# Patient Record
Sex: MALE | Race: WHITE | NOT HISPANIC OR LATINO | Employment: FULL TIME | URBAN - METROPOLITAN AREA
[De-identification: names, ages, dates, MRNs, and addresses within clinical notes are randomized per-mention and may not be internally consistent; named-entity substitution may affect disease eponyms.]

---

## 2020-07-06 ENCOUNTER — APPOINTMENT (OUTPATIENT)
Dept: CT IMAGING | Facility: CLINIC | Age: 20
End: 2020-07-06
Attending: FAMILY MEDICINE
Payer: OTHER MISCELLANEOUS

## 2020-07-06 ENCOUNTER — APPOINTMENT (OUTPATIENT)
Dept: GENERAL RADIOLOGY | Facility: CLINIC | Age: 20
End: 2020-07-06
Attending: FAMILY MEDICINE
Payer: OTHER MISCELLANEOUS

## 2020-07-06 ENCOUNTER — HOSPITAL ENCOUNTER (EMERGENCY)
Facility: CLINIC | Age: 20
Discharge: HOME | End: 2020-07-06
Attending: FAMILY MEDICINE | Admitting: FAMILY MEDICINE
Payer: OTHER MISCELLANEOUS

## 2020-07-06 VITALS
RESPIRATION RATE: 16 BRPM | DIASTOLIC BLOOD PRESSURE: 87 MMHG | TEMPERATURE: 98.7 F | OXYGEN SATURATION: 99 % | SYSTOLIC BLOOD PRESSURE: 132 MMHG | WEIGHT: 165 LBS

## 2020-07-06 DIAGNOSIS — G47.00 INSOMNIA, UNSPECIFIED TYPE: ICD-10-CM

## 2020-07-06 DIAGNOSIS — F07.81 POSTCONCUSSION SYNDROME: ICD-10-CM

## 2020-07-06 DIAGNOSIS — S60.222A CONTUSION OF LEFT HAND, INITIAL ENCOUNTER: ICD-10-CM

## 2020-07-06 PROCEDURE — 73130 X-RAY EXAM OF HAND: CPT | Mod: TC,LT

## 2020-07-06 PROCEDURE — 99284 EMERGENCY DEPT VISIT MOD MDM: CPT | Mod: Z6 | Performed by: FAMILY MEDICINE

## 2020-07-06 PROCEDURE — 99284 EMERGENCY DEPT VISIT MOD MDM: CPT | Mod: 25 | Performed by: FAMILY MEDICINE

## 2020-07-06 PROCEDURE — 70450 CT HEAD/BRAIN W/O DYE: CPT

## 2020-07-06 NOTE — ED TRIAGE NOTES
"Pt presents to ED after two falls. First Wed at work hit head on semi door. Pt has since had concussion symptoms and yesterday he blacked out and fell hitting his head again. Does not remember the incident and now has random radiating numbness to arms and legs. Pt states he slept for a day and a half since, waking up this morning at 8am. Pt concerned because of past head injuries and the numbness and some vision \"lagging\".  "

## 2020-07-06 NOTE — ED PROVIDER NOTES
History     Chief Complaint   Patient presents with     Headache     Hand Injury     HPI  Goran Reddnig is a 20 year old male who presents with concerns of continued headache and frequent falls.  Patient fell at work about 5 days ago.  Patient was seen on 1st of July for this.  They thought he had a concussion and sent him home.  He states since then he has been unable to sleep, he continues to have headaches.  He is concerned because he has been getting numbness all over.  He is getting spasms of both of his hands on occasion.  Patient feels like there is something really wrong.  Patient is had a lot of nausea and states he has been having loose stools but no more than 1 or 2 a day.  Denies any dysuria or hematuria.  Patient has been eating and drinking normally.    Allergies:  No Known Allergies    Problem List:    Patient Active Problem List    Diagnosis Date Noted     Acne 11/07/2012     Priority: Medium     ADHD (attention deficit hyperactivity disorder) 02/08/2012     Priority: Medium     2/8/2012  Diagnosis inattentive type. Trial of Methylin ER 10mg /day  5/23/2012  Dose increased over time to Methlyphenidate ER 20mg   2 tab daily  11/7/2012  Med changes recently to Adderall xR 20mg daily.  Seems better for appetite /dry mouth but wearing off before noon.  Increased to 40mg XR daily.   12/14/2012  Reported symptoms of insomnia/hallucination after change to 15mg bid Adderall.  Med currently stopped.  Will follow up after holiday.  Psychiatry refer recommended.  Substance use possibility again discussed.        Auditory processing disorder 02/08/2012     Priority: Medium     Has received speech and language through [employer].  Does not qualify for school services.           Past Medical History:    Past Medical History:   Diagnosis Date     Henoch-Schonlein purpura (H)        Past Surgical History:    Past Surgical History:   Procedure Laterality Date     pe tubes x 3         Family History:     Family History   Problem Relation Age of Onset     Heart Disease Mother         tachycardia     Neurologic Disorder Mother         adhd     Cancer - colorectal Maternal Grandmother 62        Colon Cancer     Hypertension Maternal Grandfather      Heart Disease Maternal Grandfather 56        Massive Heart Attack     Cancer Maternal Uncle 50        Eshopahgeal, Colon Cancer     Neurologic Disorder Brother         autism     Neurologic Disorder Brother         motor tic       Social History:  Marital Status:  Single [1]  Social History     Tobacco Use     Smoking status: Never Smoker     Smokeless tobacco: Never Used   Substance Use Topics     Alcohol use: No     Drug use: No     Comment: THC prescription        Medications:    amphetamine-dextroamphetamine (ADDERALL XR) 20 MG per capsule  amphetamine-dextroamphetamine (ADDERALL) 15 MG tablet  NO ACTIVE MEDICATIONS  tretinoin (RETIN-A) 0.025 % cream          Review of Systems   All other systems reviewed and are negative.      Physical Exam   BP: 132/87  Heart Rate: 96  Temp: 98.7  F (37.1  C)  Resp: 16  Weight: 74.8 kg (165 lb)  SpO2: 99 %      Physical Exam  Vitals signs and nursing note reviewed.   Constitutional:       General: He is not in acute distress.     Appearance: He is well-developed. He is not diaphoretic.   HENT:      Head: Normocephalic and atraumatic.      Nose: Nose normal.      Mouth/Throat:      Pharynx: No oropharyngeal exudate.   Eyes:      General: No scleral icterus.     Conjunctiva/sclera: Conjunctivae normal.      Pupils: Pupils are equal, round, and reactive to light.   Neck:      Musculoskeletal: Normal range of motion.   Cardiovascular:      Rate and Rhythm: Normal rate and regular rhythm.      Heart sounds: Normal heart sounds. No murmur. No friction rub.   Pulmonary:      Effort: Pulmonary effort is normal. No respiratory distress.      Breath sounds: Normal breath sounds. No wheezing or rales.   Abdominal:      General: Bowel sounds  are normal. There is no distension.      Palpations: Abdomen is soft. There is no mass.      Tenderness: There is no abdominal tenderness. There is no guarding or rebound.   Musculoskeletal: Normal range of motion.      Left hand: He exhibits tenderness and swelling. He exhibits normal range of motion, normal capillary refill, no deformity and no laceration. Decreased sensation noted. Normal strength noted.   Skin:     General: Skin is warm.      Capillary Refill: Capillary refill takes less than 2 seconds.      Findings: No rash.   Neurological:      General: No focal deficit present.      Mental Status: He is alert and oriented to person, place, and time.      Cranial Nerves: No cranial nerve deficit.      Sensory: No sensory deficit.      Motor: No weakness.      Coordination: Coordination normal.      Gait: Gait normal.      Deep Tendon Reflexes: Reflexes normal.   Psychiatric:         Judgment: Judgment normal.         ED Course        Procedures      Results for orders placed or performed during the hospital encounter of 07/06/20 (from the past 24 hour(s))   CT Head w/o Contrast    Narrative    CT SCAN OF THE HEAD WITHOUT CONTRAST   7/6/2020 9:37 AM     HISTORY: Frequent falls, head injury, headache.    TECHNIQUE:  Axial images of the head and coronal reformations without  IV contrast material. Radiation dose for this scan was reduced using  automated exposure control, adjustment of the mA and/or kV according  to patient size, or iterative reconstruction technique.    COMPARISON: None.    FINDINGS: There is no evidence of intracranial hemorrhage, mass, acute  infarct or anomaly. The ventricles are normal in size, shape and  configuration. The brain parenchyma and subarachnoid spaces are  normal.     The visualized portions of the sinuses and mastoids appear normal. The  bony calvarium and bones of the skull base appear intact.       Impression    IMPRESSION:   Normal CT scan of the head.   XR Hand Left G/E 3  Views    Narrative    XR HAND LT G/E 3 VW 7/6/2020 9:46 AM     HISTORY: fall, hand pain      Impression    IMPRESSION: Dorsal soft tissue swelling. No apparent fracture or  dislocation.    CHAO VARGAS MD       Medications - No data to display     CT scan of the head was unremarkable and x-ray of the hand was unremarkable.  Patient certainly could still be dealing with some postconcussion-like symptoms.  Think a lot of his symptoms though are related from his lack of sleep which seems like more of a chronic condition and it is exacerbating all of his other symptoms.  Patient states that he is being treated for his insomnia with oxycodone which is prescribed by his primary care doctor.  I cannot find any documentation of this though.  Many give him a prescription for Vistaril because I think it may help with his sleep but also sound like some of his other symptoms are anxiety issues also.  Recommend close follow-up with his primary care doctor if his symptoms continue.  Patient's hand was x-rayed and this was normal also.  Patient appears to have just a bruise to it.  Recommend conservative care.    Assessments & Plan (with Medical Decision Making)  Postconcussion syndrome, insomnia, left hand contusion     I have reviewed the nursing notes.    I have reviewed the findings, diagnosis, plan and need for follow up with the patient.              7/6/2020   [facility] EMERGENCY DEPARTMENT     Kishor Soliman MD  07/06/20 7280

## 2020-07-06 NOTE — ED AVS SNAPSHOT
[facility] Emergency Department  [address] [place] [address]  Phone:  [phone]  Fax:  [phone]                                    Goran Redding   MRN: [id number]    Department:  [facility] Emergency Department   Date of Visit:  7/6/2020           After Visit Summary Signature Page    I have received my discharge instructions, and my questions have been answered. I have discussed any challenges I see with this plan with the nurse or doctor.    ..........................................................................................................................................  Patient/Patient Representative Signature      ..........................................................................................................................................  Patient Representative Print Name and Relationship to Patient    ..................................................               ................................................  Date                                   Time    ..........................................................................................................................................  Reviewed by Signature/Title    ...................................................              ..............................................  Date                                               Time          22EPIC Rev 08/18

## 2022-08-11 ENCOUNTER — TELEPHONE (OUTPATIENT)
Dept: FAMILY MEDICINE | Facility: CLINIC | Age: 22
End: 2022-08-11

## 2022-08-11 NOTE — TELEPHONE ENCOUNTER
Left Voicemal to reschedule pt. Please help patient reschedule the canceled appt below:       - Provider:  Dr. Funez           - Canceled appt details         Date:          Time:  6:45 a .m.          Type of visit:  Annual physical      - Reason for change/cancellation: clinician out of clinic       Notes to consider when reschedulin minutes       Please close encounter once the patient has been rescheduled

## 2022-08-18 ENCOUNTER — OFFICE VISIT (OUTPATIENT)
Dept: FAMILY MEDICINE | Facility: CLINIC | Age: 22
End: 2022-08-18
Payer: COMMERCIAL

## 2022-08-18 VITALS
DIASTOLIC BLOOD PRESSURE: 60 MMHG | BODY MASS INDEX: 19.4 KG/M2 | SYSTOLIC BLOOD PRESSURE: 92 MMHG | TEMPERATURE: 98 F | HEART RATE: 76 BPM | HEIGHT: 75 IN | WEIGHT: 156 LBS | RESPIRATION RATE: 14 BRPM | OXYGEN SATURATION: 97 %

## 2022-08-18 DIAGNOSIS — G89.29 CHRONIC BACK PAIN, UNSPECIFIED BACK LOCATION, UNSPECIFIED BACK PAIN LATERALITY: ICD-10-CM

## 2022-08-18 DIAGNOSIS — M54.9 CHRONIC BACK PAIN, UNSPECIFIED BACK LOCATION, UNSPECIFIED BACK PAIN LATERALITY: ICD-10-CM

## 2022-08-18 DIAGNOSIS — Z71.6 ENCOUNTER FOR TOBACCO USE CESSATION COUNSELING: ICD-10-CM

## 2022-08-18 DIAGNOSIS — Z00.00 ENCOUNTER FOR ROUTINE ADULT HEALTH EXAMINATION WITHOUT ABNORMAL FINDINGS: ICD-10-CM

## 2022-08-18 DIAGNOSIS — F10.11 ALCOHOL USE DISORDER, MILD, IN EARLY REMISSION, ABUSE: ICD-10-CM

## 2022-08-18 DIAGNOSIS — F90.9 ATTENTION DEFICIT HYPERACTIVITY DISORDER (ADHD), UNSPECIFIED ADHD TYPE: ICD-10-CM

## 2022-08-18 DIAGNOSIS — F43.21 ADJUSTMENT DISORDER WITH DEPRESSED MOOD: Primary | ICD-10-CM

## 2022-08-18 PROCEDURE — 99214 OFFICE O/P EST MOD 30 MIN: CPT | Mod: 25 | Performed by: PHYSICIAN ASSISTANT

## 2022-08-18 PROCEDURE — 99385 PREV VISIT NEW AGE 18-39: CPT | Performed by: PHYSICIAN ASSISTANT

## 2022-08-18 ASSESSMENT — ANXIETY QUESTIONNAIRES
2. NOT BEING ABLE TO STOP OR CONTROL WORRYING: NEARLY EVERY DAY
5. BEING SO RESTLESS THAT IT IS HARD TO SIT STILL: NEARLY EVERY DAY
6. BECOMING EASILY ANNOYED OR IRRITABLE: NOT AT ALL
GAD7 TOTAL SCORE: 15
1. FEELING NERVOUS, ANXIOUS, OR ON EDGE: NEARLY EVERY DAY
7. FEELING AFRAID AS IF SOMETHING AWFUL MIGHT HAPPEN: NOT AT ALL
IF YOU CHECKED OFF ANY PROBLEMS ON THIS QUESTIONNAIRE, HOW DIFFICULT HAVE THESE PROBLEMS MADE IT FOR YOU TO DO YOUR WORK, TAKE CARE OF THINGS AT HOME, OR GET ALONG WITH OTHER PEOPLE: VERY DIFFICULT
3. WORRYING TOO MUCH ABOUT DIFFERENT THINGS: NEARLY EVERY DAY
GAD7 TOTAL SCORE: 15

## 2022-08-18 ASSESSMENT — PAIN SCALES - GENERAL: PAINLEVEL: NO PAIN (0)

## 2022-08-18 ASSESSMENT — PATIENT HEALTH QUESTIONNAIRE - PHQ9
SUM OF ALL RESPONSES TO PHQ QUESTIONS 1-9: 12
5. POOR APPETITE OR OVEREATING: NEARLY EVERY DAY

## 2022-08-18 NOTE — PROGRESS NOTES
SUBJECTIVE:   CC: Goran Redding is an 22 year old male who presents for preventative health visit.     {Patient has been advised of split billing requirements and indicates understanding: Yes  Healthy Habits:     Getting at least 3 servings of Calcium per day:  Yes    Bi-annual eye exam:  NO    Dental care twice a year:  Yes    Sleep apnea or symptoms of sleep apnea:  Daytime drowsiness    Diet:  Regular (no restrictions)    Frequency of exercise:  2-3 days/week    Duration of exercise:  30-45 minutes    Taking medications regularly:  Yes    Medication side effects:  None    PHQ-2 Total Score: 0    Additional concerns today:  No    New patient to our clinic today  Struggling with anxiety/depression.   Has hx of ADHD, ETOH use now in remission.   Would like to talk about starting medication and therapy.      Today's PHQ-2 Score:   PHQ-2 ( 1999 [employer]) 8/18/2022   Q1: Little interest or pleasure in doing things 0   Q2: Feeling down, depressed or hopeless 0   PHQ-2 Score 0   Q1: Little interest or pleasure in doing things Not at all   Q2: Feeling down, depressed or hopeless Not at all   PHQ-2 Score 0       Abuse: Current or Past(Physical, Sexual or Emotional)- No  Do you feel safe in your environment? Yes    Have you ever done Advance Care Planning? (For example, a Health Directive, POLST, or a discussion with a medical provider or your loved ones about your wishes): not interested    Social History     Tobacco Use     Smoking status: Current Every Day Smoker     Smokeless tobacco: Never Used   Substance Use Topics     Alcohol use: No         Alcohol Use 8/18/2022   Prescreen: >3 drinks/day or >7 drinks/week? No     Last PSA: No results found for: PSA    Reviewed orders with patient. Reviewed health maintenance and updated orders accordingly - Yes  Patient Active Problem List   Diagnosis     ADHD (attention deficit hyperactivity disorder)     Auditory processing disorder     Acne     Alcohol use disorder, mild, in  early remission, abuse     Past Surgical History:   Procedure Laterality Date     pe tubes x 3         Social History     Tobacco Use     Smoking status: Current Every Day Smoker     Smokeless tobacco: Never Used   Substance Use Topics     Alcohol use: No     Family History   Problem Relation Age of Onset     Heart Disease Mother         tachycardia     Neurologic Disorder Mother         adhd     Cancer - colorectal Maternal Grandmother 62        Colon Cancer     Hypertension Maternal Grandfather      Heart Disease Maternal Grandfather 56        Massive Heart Attack     Cancer Maternal Uncle 50        Eshopahgeal, Colon Cancer     Neurologic Disorder Brother         autism     Neurologic Disorder Brother         motor tic         Current Outpatient Medications   Medication Sig Dispense Refill     FLUoxetine (PROZAC) 20 MG capsule Take 1 capsule (20 mg) by mouth daily 90 capsule 1     NO ACTIVE MEDICATIONS  (Patient not taking: Reported on 8/18/2022)       No Known Allergies    Reviewed and updated as needed this visit by clinical staff   Tobacco  Allergies  Meds                Reviewed and updated as needed this visit by Provider                   Past Medical History:   Diagnosis Date     Henoch-Schonlein purpura (H)     2006  no recur.       Past Surgical History:   Procedure Laterality Date     pe tubes x 3         Review of Systems  CONSTITUTIONAL: NEGATIVE for fever, chills, change in weight  INTEGUMENTARY/SKIN: NEGATIVE for worrisome rashes, moles or lesions  EYES: NEGATIVE for vision changes or irritation  ENT: NEGATIVE for ear, mouth and throat problems  RESP: NEGATIVE for significant cough or SOB  CV: NEGATIVE for chest pain, palpitations or peripheral edema  GI: NEGATIVE for nausea, abdominal pain, heartburn, or change in bowel habits   male: negative for dysuria, hematuria, decreased urinary stream, erectile dysfunction, urethral discharge  MUSCULOSKELETAL: NEGATIVE for significant arthralgias or  "myalgia  NEURO: NEGATIVE for weakness, dizziness or paresthesias  PSYCHIATRIC: NEGATIVE for changes in mood or affect    OBJECTIVE:   BP 92/60   Pulse 76   Temp 98  F (36.7  C) (Tympanic)   Resp 14   Ht 1.905 m (6' 3\")   Wt 70.8 kg (156 lb)   SpO2 97%   BMI 19.50 kg/m      Physical Exam  GENERAL: healthy, alert and no distress  EYES: Eyes grossly normal to inspection, PERRL and conjunctivae and sclerae normal  HENT: ear canals and TM's normal, nose and mouth without ulcers or lesions  NECK: no adenopathy, no asymmetry, masses, or scars and thyroid normal to palpation  RESP: lungs clear to auscultation - no rales, rhonchi or wheezes  CV: regular rate and rhythm, normal S1 S2, no S3 or S4, no murmur, click or rub, no peripheral edema and peripheral pulses strong  ABDOMEN: soft, nontender, no hepatosplenomegaly, no masses and bowel sounds normal  MS: no gross musculoskeletal defects noted, no edema  SKIN: no suspicious lesions or rashes  NEURO: Normal strength and tone, mentation intact and speech normal  PSYCH: mentation appears normal, affect normal/bright    Diagnostic Test Results:  none     ASSESSMENT/PLAN:       1. Encounter for routine adult health examination without abnormal findings    2. Adjustment disorder with depressed mood  Uses and SE discussed and understood by patient   - Adult Mental Health  Referral; Future  - FLUoxetine (PROZAC) 20 MG capsule; Take 1 capsule (20 mg) by mouth daily  Dispense: 90 capsule; Refill: 1    3. Chronic back pain, unspecified back location, unspecified back pain laterality    4. Attention deficit hyperactivity disorder (ADHD), unspecified ADHD type    5. Encounter for tobacco use cessation counseling    6. Alcohol use disorder, mild, in early remission, abuse        COUNSELING:   Reviewed preventive health counseling, as reflected in patient instructions       Regular exercise       Healthy diet/nutrition    Estimated body mass index is 19.5 kg/m  as " "calculated from the following:    Height as of this encounter: 1.905 m (6' 3\").    Weight as of this encounter: 70.8 kg (156 lb).         He reports that he has been smoking. He has never used smokeless tobacco.      Counseling Resources:  ATP IV Guidelines  Pooled Cohorts Equation Calculator  FRAX Risk Assessment  ICSI Preventive Guidelines  Dietary Guidelines for Americans, 2010  USDA's MyPlate  ASA Prophylaxis  Lung CA Screening    [facility]  "

## 2023-12-27 ENCOUNTER — TELEPHONE (OUTPATIENT)
Dept: BEHAVIORAL HEALTH | Facility: CLINIC | Age: 23
End: 2023-12-27

## 2023-12-27 ENCOUNTER — HOSPITAL ENCOUNTER (INPATIENT)
Facility: CLINIC | Age: 23
LOS: 2 days | Discharge: PSYCHIATRIC OR SUBSTANCE USE FACILITY | DRG: 897 | End: 2023-12-29
Attending: EMERGENCY MEDICINE | Admitting: PSYCHIATRY & NEUROLOGY
Payer: COMMERCIAL

## 2023-12-27 DIAGNOSIS — F10.230 ALCOHOL DEPENDENCE WITH UNCOMPLICATED WITHDRAWAL (H): ICD-10-CM

## 2023-12-27 DIAGNOSIS — F41.1 GENERALIZED ANXIETY DISORDER: Primary | ICD-10-CM

## 2023-12-27 DIAGNOSIS — F12.90 MARIJUANA USE: ICD-10-CM

## 2023-12-27 LAB
ALBUMIN SERPL BCG-MCNC: 5.4 G/DL (ref 3.5–5.2)
ALCOHOL BREATH TEST: 0.2 (ref 0–0.01)
ALP SERPL-CCNC: 79 U/L (ref 40–150)
ALT SERPL W P-5'-P-CCNC: 42 U/L (ref 0–70)
AMPHETAMINES UR QL SCN: ABNORMAL
ANION GAP SERPL CALCULATED.3IONS-SCNC: 16 MMOL/L (ref 7–15)
AST SERPL W P-5'-P-CCNC: 53 U/L (ref 0–45)
BARBITURATES UR QL SCN: ABNORMAL
BASOPHILS # BLD AUTO: 0.1 10E3/UL (ref 0–0.2)
BASOPHILS NFR BLD AUTO: 2 %
BENZODIAZ UR QL SCN: ABNORMAL
BILIRUB SERPL-MCNC: 1.3 MG/DL
BUN SERPL-MCNC: 13.3 MG/DL (ref 6–20)
BZE UR QL SCN: ABNORMAL
CALCIUM SERPL-MCNC: 10.1 MG/DL (ref 8.6–10)
CANNABINOIDS UR QL SCN: ABNORMAL
CHLORIDE SERPL-SCNC: 95 MMOL/L (ref 98–107)
CREAT SERPL-MCNC: 0.79 MG/DL (ref 0.67–1.17)
DEPRECATED HCO3 PLAS-SCNC: 27 MMOL/L (ref 22–29)
EGFRCR SERPLBLD CKD-EPI 2021: >90 ML/MIN/1.73M2
EOSINOPHIL # BLD AUTO: 0 10E3/UL (ref 0–0.7)
EOSINOPHIL NFR BLD AUTO: 0 %
ERYTHROCYTE [DISTWIDTH] IN BLOOD BY AUTOMATED COUNT: 12.6 % (ref 10–15)
ETHANOL SERPL-MCNC: 0.2 G/DL
FENTANYL UR QL: ABNORMAL
GLUCOSE SERPL-MCNC: 83 MG/DL (ref 70–99)
HCT VFR BLD AUTO: 51.6 % (ref 40–53)
HGB BLD-MCNC: 17.8 G/DL (ref 13.3–17.7)
IMM GRANULOCYTES # BLD: 0 10E3/UL
IMM GRANULOCYTES NFR BLD: 0 %
LYMPHOCYTES # BLD AUTO: 2 10E3/UL (ref 0.8–5.3)
LYMPHOCYTES NFR BLD AUTO: 29 %
MAGNESIUM SERPL-MCNC: 1.9 MG/DL (ref 1.7–2.3)
MCH RBC QN AUTO: 30.6 PG (ref 26.5–33)
MCHC RBC AUTO-ENTMCNC: 34.5 G/DL (ref 31.5–36.5)
MCV RBC AUTO: 89 FL (ref 78–100)
MONOCYTES # BLD AUTO: 0.5 10E3/UL (ref 0–1.3)
MONOCYTES NFR BLD AUTO: 7 %
NEUTROPHILS # BLD AUTO: 4.2 10E3/UL (ref 1.6–8.3)
NEUTROPHILS NFR BLD AUTO: 62 %
NRBC # BLD AUTO: 0 10E3/UL
NRBC BLD AUTO-RTO: 0 /100
OPIATES UR QL SCN: ABNORMAL
PCP QUAL URINE (ROCHE): ABNORMAL
PLATELET # BLD AUTO: 333 10E3/UL (ref 150–450)
POTASSIUM SERPL-SCNC: 4 MMOL/L (ref 3.4–5.3)
PROT SERPL-MCNC: 8.3 G/DL (ref 6.4–8.3)
RBC # BLD AUTO: 5.81 10E6/UL (ref 4.4–5.9)
SODIUM SERPL-SCNC: 138 MMOL/L (ref 135–145)
WBC # BLD AUTO: 6.8 10E3/UL (ref 4–11)

## 2023-12-27 PROCEDURE — 250N000013 HC RX MED GY IP 250 OP 250 PS 637: Performed by: EMERGENCY MEDICINE

## 2023-12-27 PROCEDURE — 80053 COMPREHEN METABOLIC PANEL: CPT | Performed by: EMERGENCY MEDICINE

## 2023-12-27 PROCEDURE — HZ2ZZZZ DETOXIFICATION SERVICES FOR SUBSTANCE ABUSE TREATMENT: ICD-10-PCS | Performed by: EMERGENCY MEDICINE

## 2023-12-27 PROCEDURE — 128N000004 HC R&B CD ADULT

## 2023-12-27 PROCEDURE — 80307 DRUG TEST PRSMV CHEM ANLYZR: CPT | Performed by: EMERGENCY MEDICINE

## 2023-12-27 PROCEDURE — 83735 ASSAY OF MAGNESIUM: CPT | Performed by: EMERGENCY MEDICINE

## 2023-12-27 PROCEDURE — 36415 COLL VENOUS BLD VENIPUNCTURE: CPT | Performed by: EMERGENCY MEDICINE

## 2023-12-27 PROCEDURE — 82075 ASSAY OF BREATH ETHANOL: CPT | Performed by: EMERGENCY MEDICINE

## 2023-12-27 PROCEDURE — 85004 AUTOMATED DIFF WBC COUNT: CPT | Performed by: EMERGENCY MEDICINE

## 2023-12-27 PROCEDURE — 99285 EMERGENCY DEPT VISIT HI MDM: CPT | Mod: 25 | Performed by: EMERGENCY MEDICINE

## 2023-12-27 PROCEDURE — 82077 ASSAY SPEC XCP UR&BREATH IA: CPT | Performed by: EMERGENCY MEDICINE

## 2023-12-27 PROCEDURE — 250N000011 HC RX IP 250 OP 636: Performed by: EMERGENCY MEDICINE

## 2023-12-27 PROCEDURE — 250N000013 HC RX MED GY IP 250 OP 250 PS 637: Performed by: PSYCHIATRY & NEUROLOGY

## 2023-12-27 PROCEDURE — 99285 EMERGENCY DEPT VISIT HI MDM: CPT | Performed by: EMERGENCY MEDICINE

## 2023-12-27 RX ORDER — FOLIC ACID 1 MG/1
1 TABLET ORAL DAILY
Status: DISCONTINUED | OUTPATIENT
Start: 2023-12-27 | End: 2023-12-29 | Stop reason: HOSPADM

## 2023-12-27 RX ORDER — LOPERAMIDE HCL 2 MG
2 CAPSULE ORAL 4 TIMES DAILY PRN
Status: DISCONTINUED | OUTPATIENT
Start: 2023-12-27 | End: 2023-12-29 | Stop reason: HOSPADM

## 2023-12-27 RX ORDER — TRAZODONE HYDROCHLORIDE 50 MG/1
50 TABLET, FILM COATED ORAL
Status: DISCONTINUED | OUTPATIENT
Start: 2023-12-27 | End: 2023-12-28

## 2023-12-27 RX ORDER — DIAZEPAM 5 MG
5-20 TABLET ORAL EVERY 30 MIN PRN
Status: DISCONTINUED | OUTPATIENT
Start: 2023-12-27 | End: 2023-12-27

## 2023-12-27 RX ORDER — ACETAMINOPHEN 325 MG/1
650 TABLET ORAL EVERY 4 HOURS PRN
Status: DISCONTINUED | OUTPATIENT
Start: 2023-12-27 | End: 2023-12-29 | Stop reason: HOSPADM

## 2023-12-27 RX ORDER — ATENOLOL 50 MG/1
50 TABLET ORAL DAILY PRN
Status: DISCONTINUED | OUTPATIENT
Start: 2023-12-27 | End: 2023-12-29

## 2023-12-27 RX ORDER — MAGNESIUM HYDROXIDE/ALUMINUM HYDROXICE/SIMETHICONE 120; 1200; 1200 MG/30ML; MG/30ML; MG/30ML
30 SUSPENSION ORAL EVERY 4 HOURS PRN
Status: DISCONTINUED | OUTPATIENT
Start: 2023-12-27 | End: 2023-12-29 | Stop reason: HOSPADM

## 2023-12-27 RX ORDER — MULTIPLE VITAMINS W/ MINERALS TAB 9MG-400MCG
1 TAB ORAL DAILY
Status: DISCONTINUED | OUTPATIENT
Start: 2023-12-27 | End: 2023-12-29 | Stop reason: HOSPADM

## 2023-12-27 RX ORDER — ZINC GLUCONATE 50 MG
50 TABLET ORAL DAILY
COMMUNITY

## 2023-12-27 RX ORDER — HYDROXYZINE HYDROCHLORIDE 25 MG/1
25 TABLET, FILM COATED ORAL EVERY 4 HOURS PRN
Status: DISCONTINUED | OUTPATIENT
Start: 2023-12-27 | End: 2023-12-28

## 2023-12-27 RX ORDER — CHLORAL HYDRATE 500 MG
1 CAPSULE ORAL DAILY
COMMUNITY

## 2023-12-27 RX ORDER — AMOXICILLIN 250 MG
1 CAPSULE ORAL 2 TIMES DAILY PRN
Status: DISCONTINUED | OUTPATIENT
Start: 2023-12-27 | End: 2023-12-29 | Stop reason: HOSPADM

## 2023-12-27 RX ORDER — DIAZEPAM 5 MG
5-20 TABLET ORAL EVERY 30 MIN PRN
Status: DISCONTINUED | OUTPATIENT
Start: 2023-12-27 | End: 2023-12-29

## 2023-12-27 RX ORDER — ONDANSETRON 8 MG/1
8 TABLET, ORALLY DISINTEGRATING ORAL ONCE
Status: COMPLETED | OUTPATIENT
Start: 2023-12-27 | End: 2023-12-27

## 2023-12-27 RX ORDER — GAUZE BANDAGE 2" X 2"
1 BANDAGE TOPICAL DAILY
COMMUNITY
Start: 2023-08-19

## 2023-12-27 RX ORDER — ONDANSETRON 4 MG/1
4 TABLET, ORALLY DISINTEGRATING ORAL EVERY 6 HOURS PRN
Status: DISCONTINUED | OUTPATIENT
Start: 2023-12-27 | End: 2023-12-29 | Stop reason: HOSPADM

## 2023-12-27 RX ORDER — PANTOPRAZOLE SODIUM 40 MG/1
40 TABLET, DELAYED RELEASE ORAL ONCE
Status: DISCONTINUED | OUTPATIENT
Start: 2023-12-27 | End: 2023-12-28

## 2023-12-27 RX ADMIN — NICOTINE POLACRILEX 4 MG: 4 GUM, CHEWING BUCCAL at 15:59

## 2023-12-27 RX ADMIN — FOLIC ACID 1 MG: 1 TABLET ORAL at 17:43

## 2023-12-27 RX ADMIN — TRAZODONE HYDROCHLORIDE 50 MG: 50 TABLET ORAL at 21:37

## 2023-12-27 RX ADMIN — NICOTINE POLACRILEX 4 MG: 4 GUM, CHEWING BUCCAL at 22:21

## 2023-12-27 RX ADMIN — DIAZEPAM 10 MG: 5 TABLET ORAL at 15:43

## 2023-12-27 RX ADMIN — DIAZEPAM 10 MG: 5 TABLET ORAL at 14:16

## 2023-12-27 RX ADMIN — HYDROXYZINE HYDROCHLORIDE 25 MG: 25 TABLET, FILM COATED ORAL at 17:43

## 2023-12-27 RX ADMIN — NICOTINE POLACRILEX 4 MG: 4 GUM, CHEWING BUCCAL at 13:46

## 2023-12-27 RX ADMIN — NICOTINE POLACRILEX 4 MG: 4 GUM, CHEWING BUCCAL at 21:04

## 2023-12-27 RX ADMIN — THIAMINE HCL TAB 100 MG 100 MG: 100 TAB at 17:43

## 2023-12-27 RX ADMIN — DIAZEPAM 10 MG: 5 TABLET ORAL at 17:43

## 2023-12-27 RX ADMIN — Medication 1 TABLET: at 17:43

## 2023-12-27 RX ADMIN — ATENOLOL 50 MG: 50 TABLET ORAL at 20:35

## 2023-12-27 RX ADMIN — ONDANSETRON 8 MG: 8 TABLET, ORALLY DISINTEGRATING ORAL at 16:39

## 2023-12-27 ASSESSMENT — ACTIVITIES OF DAILY LIVING (ADL)
ADLS_ACUITY_SCORE: 35
ADLS_ACUITY_SCORE: 35
ADLS_ACUITY_SCORE: 28
ORAL_HYGIENE: INDEPENDENT
ADLS_ACUITY_SCORE: 28
DRESS: INDEPENDENT
ADLS_ACUITY_SCORE: 28
ADLS_ACUITY_SCORE: 45
HYGIENE/GROOMING: INDEPENDENT

## 2023-12-27 NOTE — MEDICATION SCRIBE - ADMISSION MEDICATION HISTORY
Medication Scribe Admission Medication History    Admission medication history is complete. The information provided in this note is only as accurate as the sources available at the time of the update.    Information Source(s): Patient and CareEverywhere/SureScripts via in-person    Pertinent Information: Pt is not taking any medications, just supplements.     Changes made to PTA medication list:  Added: fish oil, vitamin b complex, thiamine b-1, zinc  Deleted: prozac  Changed: None    Medication Affordability:       Allergies reviewed with patient and updates made in EHR: yes    Medication History Completed By: Valerie Diaz 12/27/2023 2:55 PM    PTA Med List   Medication Sig Last Dose    fish oil-omega-3 fatty acids (OMEGA-3 FISH OIL) 1000 MG capsule Take 1 capsule by mouth daily 12/27/2023    vitamin B complex with vitamin C (VITAMIN  B COMPLEX) tablet Take 1 tablet by mouth daily 12/27/2023    vitamin B1 (THIAMINE) 100 MG tablet Take 1 tablet by mouth daily 12/27/2023    zinc gluconate 50 MG tablet Take 50 mg by mouth daily 12/27/2023

## 2023-12-27 NOTE — ED TRIAGE NOTES
Triage Assessment (Adult)       Row Name 12/27/23 1211          Triage Assessment    Airway WDL WDL        Respiratory WDL    Respiratory WDL WDL        Skin Circulation/Temperature WDL    Skin Circulation/Temperature WDL WDL        Cardiac WDL    Cardiac WDL WDL        Peripheral/Neurovascular WDL    Peripheral Neurovascular WDL WDL        Cognitive/Neuro/Behavioral WDL    Cognitive/Neuro/Behavioral WDL WDL

## 2023-12-27 NOTE — TELEPHONE ENCOUNTER
S: [facility] Michael , Provider Narinder calling at 2:29 PM  with clinical on a 23 year old/Male presenting for alcohol detox.     B: Pt presents for ETOH detox.   Currently reports drinking 1.75 ml of Vodka daily for last 7 days since relapsing.    Patient reports last use was 8 hours previously.  Pt MARION: .20  Pt  endorses hx of DT but no actual memory of when  Pt  endorses hx of seizures. Last seizure:     but no actual memory of when  Pt endorsing the following symptoms of withdrawal: Hypertensive and Vomiting  MSSA Score: Still too intoxicated but gotten Valium    Pt denies acute mental health or medical concerns.   Pt endorses other drug use: Cannabis Amount/frequency:  Uses for Migraines  as a Medical treatment and has a Card    Does Pt have a detox care plan in Epic? None  Does pt present with specific needs, assistive devices, or exclusionary criteria? None  Is the patient ambulating, eating and drinking in the ED? Yes    A: Pt meets criteria to be presented for IP detox admission. Patient is voluntary    COVID Symptoms: No  If yes, COVID test required   Utox: Positive for THC  CMP: Abnormalities: AST is 53  CBC: WNL  HCG: N/A     R: Patient cleared and ready for behavioral bed placement: Yes    Pt is meeting criteria for presentation to 3A/CD    Does Patient need a Transfer Center request created? No, Pt is located within [facility] ED, [facility] ED, or [place] ED     2:44 PM Paged unit 3A Provider    2:47 PM  Josiah accepted for 3A/CD/Josiah    Updated worklist and added to the Admit Board and did Indicia    3:14 PM Updated unit 3A and left message for Charge RN    3:15 PM Updated [facility] ED

## 2023-12-27 NOTE — PROGRESS NOTES
12/27/23 3586   Patient Belongings   Did you bring any home meds/supplements to the hospital?  No   Patient Belongings other (see comments)   Patient Belongings Put in Hospital Secure Location (Security or Locker, etc.) other (see comments)   Belongings Search Yes   Clothing Search Yes   Second Staff Sai

## 2023-12-27 NOTE — ED PROVIDER NOTES
ED Provider Note  [facility]      History     Chief Complaint   Patient presents with    Alcohol Problem     Seeking detox for alcohol, has been on a two-week bending, drinking almost 1.75 L of vodka daily, last drink around eight hours ago, reports history of withdrawal seizures, last seizure unknown    Suicidal     States had had suicidal thoughts since [place], denies attempts     HPI  Goran Redding is a 23 year old male with a history of polysubstance use (alcohol, opiate) presents to the ED for detox.  He says he relapsed 7 days ago and has been drinking 1.75 L of vodka daily.  Last drink was 8 hours ago.  He kept checking his alcohol level and it was high and not coming down. He wants to go to detox and then treatment.  He says he has had withdrawal seizures and DTs in past but can't say last time. He also uses thc medicinally for hemiplegic migraines. He was feeling suicidal when he was high but not now. He denies si, plan or intent.  He takes meds for his migraines but nothing else.  Denies covid symptoms. He vomited last night when intoxicated. He is not feeling nauseous and no abdominal pain. He is a smoker.     Past Medical History  Past Medical History:   Diagnosis Date    Henoch-Schonlein purpura (H)     2006  no recur.      Past Surgical History:   Procedure Laterality Date    pe tubes x 3       FLUoxetine (PROZAC) 20 MG capsule  NO ACTIVE MEDICATIONS      No Known Allergies  Family History  Family History   Problem Relation Age of Onset    Heart Disease Mother         tachycardia    Neurologic Disorder Mother         adhd    Cancer - colorectal Maternal Grandmother 62        Colon Cancer    Hypertension Maternal Grandfather     Heart Disease Maternal Grandfather 56        Massive Heart Attack    Cancer Maternal Uncle 50        Eshopahgeal, Colon Cancer    Neurologic Disorder Brother         autism    Neurologic Disorder Brother         motor tic     Social History    Social History     Tobacco Use    Smoking status: Every Day    Smokeless tobacco: Never   Vaping Use    Vaping Use: Never used   Substance Use Topics    Alcohol use: No    Drug use: No     Comment: THC prescription         A medically appropriate review of systems was performed with pertinent positives and negatives noted in the HPI, and all other systems negative.    Physical Exam   BP: (!) 147/88  Pulse: 94  Temp: 98.5  F (36.9  C)  Resp: 16  Weight: 72.6 kg (160 lb)  SpO2: 95 %  Physical Exam  Vitals and nursing note reviewed.   HENT:      Head: Normocephalic and atraumatic.      Nose: No congestion or rhinorrhea.   Eyes:      General: No scleral icterus.        Right eye: No discharge.         Left eye: No discharge.   Cardiovascular:      Rate and Rhythm: Normal rate and regular rhythm.      Heart sounds: Normal heart sounds.   Pulmonary:      Effort: Pulmonary effort is normal.      Breath sounds: Normal breath sounds.   Abdominal:      General: There is no distension.      Palpations: Abdomen is soft. There is no mass.      Tenderness: There is no abdominal tenderness. There is no guarding or rebound.      Hernia: No hernia is present.   Musculoskeletal:         General: No signs of injury. Normal range of motion.      Cervical back: Normal range of motion.   Skin:     General: Skin is warm and dry.      Coloration: Skin is not jaundiced.   Neurological:      General: No focal deficit present.      Mental Status: He is alert and oriented to person, place, and time.   Psychiatric:         Attention and Perception: Attention and perception normal.         Mood and Affect: Mood is anxious and depressed.         Speech: Speech normal.         Behavior: Behavior normal. Behavior is cooperative.         Thought Content: Thought content normal.         Cognition and Memory: Cognition and memory normal.         Judgment: Judgment normal.           ED Course, Procedures, & Data      Procedures       Mental Health  Risk Assessment        PSS-3      Date and Time Over the past 2 weeks have you felt down, depressed, or hopeless? Over the past 2 weeks have you had thoughts of killing yourself? Have you ever attempted to kill yourself? When did this last happen? User   12/27/23 1211 yes yes no -- JAQUELIN          C-SSRS ([place])      Date and Time Q1 Wished to be Dead (Past Month) Q2 Suicidal Thoughts (Past Month) Q3 Suicidal Thought Method Q4 Suicidal Intent without Specific Plan Q5 Suicide Intent with Specific Plan Q6 Suicide Behavior (Lifetime) Within the Past 3 Months? RETIRED: Level of Risk per Screen Screening Not Complete User   12/27/23 1211 yes yes no no no -- -- -- -- JAQUELIN                       Results for orders placed or performed during the hospital encounter of 12/27/23   Comprehensive metabolic panel     Status: Abnormal   Result Value Ref Range    Sodium 138 135 - 145 mmol/L    Potassium 4.0 3.4 - 5.3 mmol/L    Carbon Dioxide (CO2) 27 22 - 29 mmol/L    Anion Gap 16 (H) 7 - 15 mmol/L    Urea Nitrogen 13.3 6.0 - 20.0 mg/dL    Creatinine 0.79 0.67 - 1.17 mg/dL    GFR Estimate >90 >60 mL/min/1.73m2    Calcium 10.1 (H) 8.6 - 10.0 mg/dL    Chloride 95 (L) 98 - 107 mmol/L    Glucose 83 70 - 99 mg/dL    Alkaline Phosphatase 79 40 - 150 U/L    AST 53 (H) 0 - 45 U/L    ALT 42 0 - 70 U/L    Protein Total 8.3 6.4 - 8.3 g/dL    Albumin 5.4 (H) 3.5 - 5.2 g/dL    Bilirubin Total 1.3 (H) <=1.2 mg/dL   Magnesium     Status: Normal   Result Value Ref Range    Magnesium 1.9 1.7 - 2.3 mg/dL   Ethyl Alcohol Level     Status: Abnormal   Result Value Ref Range    Alcohol ethyl 0.20 (H) <=0.01 g/dL   CBC with platelets and differential     Status: Abnormal   Result Value Ref Range    WBC Count 6.8 4.0 - 11.0 10e3/uL    RBC Count 5.81 4.40 - 5.90 10e6/uL    Hemoglobin 17.8 (H) 13.3 - 17.7 g/dL    Hematocrit 51.6 40.0 - 53.0 %    MCV 89 78 - 100 fL    MCH 30.6 26.5 - 33.0 pg    MCHC 34.5 31.5 - 36.5 g/dL    RDW 12.6 10.0 - 15.0 %    Platelet  Count 333 150 - 450 10e3/uL    % Neutrophils 62 %    % Lymphocytes 29 %    % Monocytes 7 %    % Eosinophils 0 %    % Basophils 2 %    % Immature Granulocytes 0 %    NRBCs per 100 WBC 0 <1 /100    Absolute Neutrophils 4.2 1.6 - 8.3 10e3/uL    Absolute Lymphocytes 2.0 0.8 - 5.3 10e3/uL    Absolute Monocytes 0.5 0.0 - 1.3 10e3/uL    Absolute Eosinophils 0.0 0.0 - 0.7 10e3/uL    Absolute Basophils 0.1 0.0 - 0.2 10e3/uL    Absolute Immature Granulocytes 0.0 <=0.4 10e3/uL    Absolute NRBCs 0.0 10e3/uL   Urine Drug Screen Panel     Status: Abnormal   Result Value Ref Range    Amphetamines Urine Screen Negative Screen Negative    Barbituates Urine Screen Negative Screen Negative    Benzodiazepine Urine Screen Negative Screen Negative    Cannabinoids Urine Screen Positive (A) Screen Negative    Cocaine Urine Screen Negative Screen Negative    Fentanyl Qual Urine Screen Negative Screen Negative    Opiates Urine Screen Negative Screen Negative    PCP Urine Screen Negative Screen Negative   Alcohol breath test POCT     Status: Abnormal   Result Value Ref Range    Alcohol Breath Test 0.197 (A) 0.00 - 0.01   CBC with platelets differential     Status: Abnormal    Narrative    The following orders were created for panel order CBC with platelets differential.  Procedure                               Abnormality         Status                     ---------                               -----------         ------                     CBC with platelets and d...[[id number]]  Abnormal            Final result                 Please view results for these tests on the individual orders.   Urine Drug Screen     Status: Abnormal    Narrative    The following orders were created for panel order Urine Drug Screen.  Procedure                               Abnormality         Status                     ---------                               -----------         ------                     Urine Drug Screen Panel[[id number]]      Abnormal             Final result                 Please view results for these tests on the individual orders.     Medications   diazepam (VALIUM) tablet 5-20 mg (10 mg Oral $Given 12/27/23 1416)   nicotine polacrilex (NICORETTE) gum 4 mg (4 mg Buccal $Given 12/27/23 1346)     Labs Ordered and Resulted from Time of ED Arrival to Time of ED Departure   COMPREHENSIVE METABOLIC PANEL - Abnormal       Result Value    Sodium 138      Potassium 4.0      Carbon Dioxide (CO2) 27      Anion Gap 16 (*)     Urea Nitrogen 13.3      Creatinine 0.79      GFR Estimate >90      Calcium 10.1 (*)     Chloride 95 (*)     Glucose 83      Alkaline Phosphatase 79      AST 53 (*)     ALT 42      Protein Total 8.3      Albumin 5.4 (*)     Bilirubin Total 1.3 (*)    ETHYL ALCOHOL LEVEL - Abnormal    Alcohol ethyl 0.20 (*)    CBC WITH PLATELETS AND DIFFERENTIAL - Abnormal    WBC Count 6.8      RBC Count 5.81      Hemoglobin 17.8 (*)     Hematocrit 51.6      MCV 89      MCH 30.6      MCHC 34.5      RDW 12.6      Platelet Count 333      % Neutrophils 62      % Lymphocytes 29      % Monocytes 7      % Eosinophils 0      % Basophils 2      % Immature Granulocytes 0      NRBCs per 100 WBC 0      Absolute Neutrophils 4.2      Absolute Lymphocytes 2.0      Absolute Monocytes 0.5      Absolute Eosinophils 0.0      Absolute Basophils 0.1      Absolute Immature Granulocytes 0.0      Absolute NRBCs 0.0     URINE DRUG SCREEN PANEL - Abnormal    Amphetamines Urine Screen Negative      Barbituates Urine Screen Negative      Benzodiazepine Urine Screen Negative      Cannabinoids Urine Screen Positive (*)     Cocaine Urine Screen Negative      Fentanyl Qual Urine Screen Negative      Opiates Urine Screen Negative      PCP Urine Screen Negative     ALCOHOL BREATH TEST POCT - Abnormal    Alcohol Breath Test 0.197 (*)    MAGNESIUM - Normal    Magnesium 1.9       No orders to display          Critical care was not performed.     Medical Decision Making  The patient's presentation  was of high complexity (a chronic illness severe exacerbation, progression, or side effect of treatment).    The patient's evaluation involved:  review of external note(s) from 1 sources (8/17-18/23 discharge note)  ordering and/or review of 3+ test(s) in this encounter (see separate area of note for details)  discussion of management or test interpretation with another health professional ()    The patient's management necessitated high risk (a decision regarding hospitalization).    Assessment & Plan    Goran Redding is a 23 year old male with a history of polysubstance use (alcohol, opiate) presents to the ED for detox.  He says he relapsed 7 days ago and has been drinking 1.75 L of vodka daily.  Last drink was 8 hours ago.  He says he has hx of withdrawals seizures and DTs.  Labs ordered and reviewed. He is medically appropriate for detox. This is a voluntary admit.  Mssa protocol started in the ED. Case discussed with  regarding admission to .     I have reviewed the nursing notes. I have reviewed the findings, diagnosis, plan and need for follow up with the patient.    New Prescriptions    No medications on file       Final diagnoses:   Alcohol dependence with uncomplicated withdrawal (H)   Marijuana use       Ansley Barcenas MD  [facility] EMERGENCY DEPARTMENT  12/27/2023     Ansley Barcenas MD  12/27/23 1430       Ansley Barcenas MD  12/27/23 6637

## 2023-12-27 NOTE — PROGRESS NOTES
12/27/23 1726   Patient Belongings   Did you bring any home meds/supplements to the hospital?  No   Patient Belongings other (see comments)   Patient Belongings Put in Hospital Secure Location (Security or Locker, etc.) other (see comments)   Belongings Search Yes   Clothing Search Yes   Second Staff Sai     Storage Bin:Sweatshirt, and belt    Med Room Bin: Wallet, keys,cell phone and keys     Security Env.[id number]: cash 4 dollars, 2 Raj visa, quicksilver card,1 Master card, 1 capital one ,4 money card, 1 terry visa card ,1 freedom visa card and 4  License   A               Admission:  I am responsible for any personal items that are not sent to the safe or pharmacy.  [facility] is not responsible for loss, theft or damage of any property in my possession.    Signature:  _________________________________ Date: _______  Time: _____                                              Staff Signature:  ____________________________ Date: ________  Time: _____      2nd Staff person, if patient is unable/unwilling to sign:    Signature: ________________________________ Date: ________  Time: _____     Discharge:  [facility] has returned all of my personal belongings:    Signature: _________________________________ Date: ________  Time: _____                                          Staff Signature:  ____________________________ Date: ________  Time: _____

## 2023-12-28 LAB
ALBUMIN SERPL BCG-MCNC: 4.6 G/DL (ref 3.5–5.2)
ALP SERPL-CCNC: 70 U/L (ref 40–150)
ALT SERPL W P-5'-P-CCNC: 32 U/L (ref 0–70)
ANION GAP SERPL CALCULATED.3IONS-SCNC: 9 MMOL/L (ref 7–15)
AST SERPL W P-5'-P-CCNC: 35 U/L (ref 0–45)
BILIRUB SERPL-MCNC: 2.2 MG/DL
BUN SERPL-MCNC: 17.6 MG/DL (ref 6–20)
CALCIUM SERPL-MCNC: 10 MG/DL (ref 8.6–10)
CHLORIDE SERPL-SCNC: 98 MMOL/L (ref 98–107)
CHOLEST SERPL-MCNC: 207 MG/DL
CREAT SERPL-MCNC: 0.91 MG/DL (ref 0.67–1.17)
DEPRECATED HCO3 PLAS-SCNC: 32 MMOL/L (ref 22–29)
EGFRCR SERPLBLD CKD-EPI 2021: >90 ML/MIN/1.73M2
GGT SERPL-CCNC: 26 U/L (ref 8–61)
GLUCOSE SERPL-MCNC: 86 MG/DL (ref 70–99)
HDLC SERPL-MCNC: 150 MG/DL
HOLD SPECIMEN: NORMAL
LDLC SERPL CALC-MCNC: 44 MG/DL
NONHDLC SERPL-MCNC: 57 MG/DL
POTASSIUM SERPL-SCNC: 4 MMOL/L (ref 3.4–5.3)
PROT SERPL-MCNC: 6.8 G/DL (ref 6.4–8.3)
SODIUM SERPL-SCNC: 139 MMOL/L (ref 135–145)
TRIGL SERPL-MCNC: 65 MG/DL
TSH SERPL DL<=0.005 MIU/L-ACNC: 1.15 UIU/ML (ref 0.3–4.2)

## 2023-12-28 PROCEDURE — H2032 ACTIVITY THERAPY, PER 15 MIN: HCPCS

## 2023-12-28 PROCEDURE — 99222 1ST HOSP IP/OBS MODERATE 55: CPT

## 2023-12-28 PROCEDURE — 99223 1ST HOSP IP/OBS HIGH 75: CPT | Mod: AI | Performed by: NURSE PRACTITIONER

## 2023-12-28 PROCEDURE — 250N000013 HC RX MED GY IP 250 OP 250 PS 637: Performed by: PSYCHIATRY & NEUROLOGY

## 2023-12-28 PROCEDURE — 80061 LIPID PANEL: CPT | Performed by: PSYCHIATRY & NEUROLOGY

## 2023-12-28 PROCEDURE — 250N000013 HC RX MED GY IP 250 OP 250 PS 637: Performed by: NURSE PRACTITIONER

## 2023-12-28 PROCEDURE — 250N000013 HC RX MED GY IP 250 OP 250 PS 637: Performed by: EMERGENCY MEDICINE

## 2023-12-28 PROCEDURE — 36415 COLL VENOUS BLD VENIPUNCTURE: CPT | Performed by: PSYCHIATRY & NEUROLOGY

## 2023-12-28 PROCEDURE — 80053 COMPREHEN METABOLIC PANEL: CPT

## 2023-12-28 PROCEDURE — 128N000004 HC R&B CD ADULT

## 2023-12-28 PROCEDURE — 82977 ASSAY OF GGT: CPT | Performed by: PSYCHIATRY & NEUROLOGY

## 2023-12-28 PROCEDURE — 84443 ASSAY THYROID STIM HORMONE: CPT | Performed by: PSYCHIATRY & NEUROLOGY

## 2023-12-28 RX ORDER — TRAZODONE HYDROCHLORIDE 100 MG/1
100 TABLET ORAL
Status: DISCONTINUED | OUTPATIENT
Start: 2023-12-28 | End: 2023-12-29 | Stop reason: HOSPADM

## 2023-12-28 RX ORDER — ZINC SULFATE 50(220)MG
220 CAPSULE ORAL DAILY
Status: DISCONTINUED | OUTPATIENT
Start: 2023-12-28 | End: 2023-12-29 | Stop reason: HOSPADM

## 2023-12-28 RX ORDER — HYDROXYZINE HYDROCHLORIDE 50 MG/1
50 TABLET, FILM COATED ORAL EVERY 4 HOURS PRN
Status: DISCONTINUED | OUTPATIENT
Start: 2023-12-28 | End: 2023-12-29 | Stop reason: HOSPADM

## 2023-12-28 RX ORDER — ZINC GLUCONATE 50 MG
50 TABLET ORAL DAILY
Status: DISCONTINUED | OUTPATIENT
Start: 2023-12-28 | End: 2023-12-28

## 2023-12-28 RX ORDER — MIRTAZAPINE 15 MG/1
15 TABLET, FILM COATED ORAL AT BEDTIME
Status: DISCONTINUED | OUTPATIENT
Start: 2023-12-28 | End: 2023-12-29 | Stop reason: HOSPADM

## 2023-12-28 RX ORDER — PANTOPRAZOLE SODIUM 40 MG/1
40 TABLET, DELAYED RELEASE ORAL ONCE
Status: COMPLETED | OUTPATIENT
Start: 2023-12-28 | End: 2023-12-28

## 2023-12-28 RX ORDER — CHLORAL HYDRATE 500 MG
1 CAPSULE ORAL DAILY
Status: DISCONTINUED | OUTPATIENT
Start: 2023-12-28 | End: 2023-12-29 | Stop reason: HOSPADM

## 2023-12-28 RX ADMIN — Medication 1 G: at 12:04

## 2023-12-28 RX ADMIN — Medication 1 TABLET: at 08:41

## 2023-12-28 RX ADMIN — B-COMPLEX W/ C & FOLIC ACID TAB 1 TABLET: TAB at 12:04

## 2023-12-28 RX ADMIN — MIRTAZAPINE 15 MG: 15 TABLET, FILM COATED ORAL at 22:06

## 2023-12-28 RX ADMIN — THIAMINE HCL TAB 100 MG 100 MG: 100 TAB at 08:41

## 2023-12-28 RX ADMIN — FOLIC ACID 1 MG: 1 TABLET ORAL at 08:42

## 2023-12-28 RX ADMIN — ZINC SULFATE 220 MG (50 MG) CAPSULE 220 MG: CAPSULE at 13:16

## 2023-12-28 RX ADMIN — NICOTINE POLACRILEX 4 MG: 4 GUM, CHEWING BUCCAL at 08:42

## 2023-12-28 RX ADMIN — NICOTINE POLACRILEX 4 MG: 4 GUM, CHEWING BUCCAL at 16:28

## 2023-12-28 RX ADMIN — HYDROXYZINE HYDROCHLORIDE 50 MG: 50 TABLET, FILM COATED ORAL at 08:42

## 2023-12-28 RX ADMIN — NICOTINE POLACRILEX 4 MG: 4 GUM, CHEWING BUCCAL at 11:23

## 2023-12-28 RX ADMIN — NICOTINE POLACRILEX 4 MG: 4 GUM, CHEWING BUCCAL at 13:17

## 2023-12-28 RX ADMIN — NICOTINE POLACRILEX 4 MG: 4 GUM, CHEWING BUCCAL at 20:04

## 2023-12-28 RX ADMIN — TRAZODONE HYDROCHLORIDE 50 MG: 50 TABLET ORAL at 00:11

## 2023-12-28 RX ADMIN — PANTOPRAZOLE SODIUM 40 MG: 40 TABLET, DELAYED RELEASE ORAL at 08:42

## 2023-12-28 RX ADMIN — NICOTINE POLACRILEX 4 MG: 4 GUM, CHEWING BUCCAL at 22:06

## 2023-12-28 RX ADMIN — DIAZEPAM 5 MG: 5 TABLET ORAL at 04:37

## 2023-12-28 ASSESSMENT — ACTIVITIES OF DAILY LIVING (ADL)
HYGIENE/GROOMING: INDEPENDENT
ORAL_HYGIENE: INDEPENDENT
ADLS_ACUITY_SCORE: 28
LAUNDRY: WITH SUPERVISION
ADLS_ACUITY_SCORE: 28
LAUNDRY: WITH SUPERVISION
ADLS_ACUITY_SCORE: 28
ORAL_HYGIENE: INDEPENDENT
ADLS_ACUITY_SCORE: 28
HYGIENE/GROOMING: INDEPENDENT
DRESS: INDEPENDENT
DRESS: INDEPENDENT
ADLS_ACUITY_SCORE: 28

## 2023-12-28 NOTE — PLAN OF CARE
Goal Outcome Evaluation:    Plan of Care Reviewed With: patient    Patient pleasant and cooperative, visible in milieu.  Bright affect, denies SI/SIB, depression, rated anxiety 8/10.  Received prn hydroxyzine for anxiety   MSSA 5 and 3, this shift, no valium given.  Good appetite, medication compliant.  Patient socialized and watched T.V with peers.  Plan treatment after detox; patient currently watching T.V with peers.  Will continue to monitor closely.

## 2023-12-28 NOTE — DISCHARGE INSTRUCTIONS
Behavioral Discharge Planning and Instructions  THANK YOU FOR CHOOSING [address]  [phone]    Summary: You were admitted to [facility] on 12/27/23 for detoxification from alcohol.  A medical exam was performed that included lab work. You have met with a  and opted to attend [facility].  Please take care and make your recovery a daily priority, Goran!  It was a pleasure working with you and the entire treatment team here wishes you the very best in your recovery!     Recommendation:  Residential CD Treatment at [facility] or similar program. Refrain from use of mood altering substances.     Main Diagnoses:  Per Dr. Josiah MD;  303.90 (F10.20) Alcohol Use Disorder Severe    Major Treatments, Procedures and Findings:  You were treated for alcohol detoxification using Valium. You completed a chemical dependency assessment. You had labs drawn and those results were reviewed with you. Please take a copy of your lab work with you to your next primary care provider appointment.    Symptoms to Report:  If you experience more anxiety, confusion, sleeplessness, deep sadness or thoughts of suicide, notify your treatment team or notify your primary care provider. IF ANY OF THE SYMPTOMS YOU ARE EXPERIENCING ARE A MEDICAL EMERGENCY CALL 911 IMMEDIATELY.     Lifestyle Adjustment: Adjust your lifestyle to get enough sleep, relaxation, exercise and good nutrition. Continue to develop healthy coping skills to decrease stress and promote a sober living environment. Do not use mood altering substances including alcohol, illegal drugs or addictive medications other than what is currently prescribed.     Disposition: Home - referral placed to [facility]    Facts about COVID19 at www.cdc.gov/COVID19 and www.[place].gov/covid19    Keeping hands clean is one of the most important steps we can take to avoid getting sick and spreading germs to others.  Please wash your hands frequently and lather with soap  for at least 20 seconds!    Follow-up Appointment:     You declined to set up a follow-up appointment before leaving today, stating your insurance is changing on 1/1/2024. You promised to schedule an appointment once you switch to your new insurance. Please be seen within three weeks of release.     Recovery apps for your phone to locate current in person and zoom recovery meetings  [place] - meeting alexandra  AA  - meeting alexandra  Meeting guide - meeting alexandra  Quick NA meeting - meeting alexandra  Ella- has various apps    Resources:  Some AA/NA meetings are being held online however most have returned to in-person or a hybrid combination please check online to verify*  Need Support Now? If you or someone you know is struggling or in crisis, help is available. Call or text [employer] or chat [facility]  AA meetings search for them at: https://aa-intergroup.org (worldwide meeting listings)  AA meetings for [place] area can be found online at: https://a[place].org (click local online meetings listings)  NA meetings for [place] area can be found online at: https://www.nam[place].org  (click find a meeting)  AA and NA Sponsors are excellent resources for support and you can find one at any support group meeting.   Alcoholics Anonymous (https://aa.org/): for information 24 hours/day  AA Intergroup service office in [place] (http://www.aastpaul.org/) 864.341.7606  AA Intergroup service office in [facility]: [phone]. (http://www.a[place].org/)  Narcotics Anonymous (www.nam[place].org) (482) 843-8534  https://aafairviewriverside.org/meetings  SMART Recovery - self management for addiction recovery:  www.smartrecovery.org  Pathways ~ A Health Crisis Resource & Support Center:  823.114.1558.  https://prescribetoprevent.org/patient-education/videos/  http://www.harmreduction.org  Crisis Text Line  Text 138338  You will be connected with a trained live crisis counselor to provide support. Por george joneso   "KESHA a 295193 o texto a 442-AYUDAME en WhatsApp  National Hope Line  1.800.SUICIDE [1552331]  [facility] 659-209-9794  Support Group:  AA/NA and Sponsor/support.  Fast Tracker  Linking people to mental health and substance use disorder resources  [employer].CompuMed   [place] Mental Health Warm Line  Peer to peer support  Monday thru Saturday, 12 pm to 10 pm  133.521.2512 or 2.326.120.2369  Text \"Support\" to 68931  National Bradley on Mental Illness (LAURA)  126.504.1348 or 1.888.LAURA.HELPS  Alcoholics Anonymous (www.alcoholics-anonymous.org): Check your phone book for your local chapter.  Suicide Awareness Voices of Education (SAVE) (www.save.org): 837-244-XKFW (7283)  National Suicide Prevention Line (www.mentalhealthmn.org): 890-288-UTKP (3487)  Mental Health Consumer/Survivor Network of [place] (www.mhcsn.net): 210.490.2063 or 648-061-8189  Mental Health Association of [place] (www.mentalhealth.org): 795.201.5788 or 002-137-5636   Substance Abuse and Mental Health Services (www.samhsa.gov)  [place] Opioid Prevention Coalition: www.opioidcoalition.org    [place] Recovery Connection (MRC)  [facility] connects people seeking recovery to resources that help foster and sustain long-term recovery.  Whether you are seeking resources for treatment, transportation, housing, job training, education, health care or other pathways to recovery, [facility] is a great place to start.  865.496.1196.  www.[employer].y.org    Great Pod casts for nutrition and wellness  Listen on Apple Podcasts  Dishing Up Nutrition   [employer]   Nutrition       Understand the connection between what you eat and how you feel. Hosted by licensed nutritionists and dietitians from [employer] we share practical, real-life solutions for healthier living through nutrition.     General Medication Instructions:   See your medication sheet(s) for instructions.   Take all medications as prescribed.  " Make no changes unless your primary care provider suggests them.   Go to all your primary care provider visits.  Be sure to have all your required lab tests. This way, your medicines can be refilled on time.  Do not use any forms of alcohol.    Please Note:  If you have any questions at anytime after you are discharged please call [facility] detox unit [facility] at [phone].  [facility], Behavioral Intake [phone]  Medical Records call [phone]  Outpatient Behavioral Intake call [phone]  LP+ Wait List/Bed Availability call [phone]    Please remember to take all of your behavioral discharge planning and lab paperwork to any follow up appointments, it contains your lab results, diagnosis, medication list and discharge recommendations.      THANK YOU FOR CHOOSING [facility]

## 2023-12-28 NOTE — PROGRESS NOTES
"Triage & Transition Services, [address]     Goran Redding  December 28, 2023    Insurance: Preferred One/Aetna     Legal Status: vol     SUDs Assessment Status: Needs - working on paperwork    ROIs on file: none     Living Situation: Patient states he was living alone in [place]     Current Providers and Supports:  patient states the following people are supportive \"to an extent\" - girlfriend, parents, friend Yonny from work    Encounter: Met with patient to discuss aftercare and discharge planning, patient would like to attend a 30 day residential program where he can continue to use his medical marijuana.      Collateral: none     Consulted with JAQUELIN Isaacs on Patient s plan of care.          Current Plan: Residential program that accepts medical marijuana - writer left a voicemail with [facility] but website states their men's residential is closed until 2024.   [facility] accepts medical marijuana as long as it is in pill, capsule or liquid form. Patient will be referred to [facility] once assessment is complete    RN updated.    Purvi Vazquez  Triage & Transition Services - Mental Health and Addiction Service Line  [facility] - Adult Inpatient Addiction Psychiatry Unit           "

## 2023-12-28 NOTE — PROGRESS NOTES
"3A Admission Note  S = Situation:   Goran Redding is a 23 year old year old male with a chief complaint of Alcohol Problem. Voluntary admission to [facility] for alcohol withdrawal and detox.  B  = Background:    Pt presented to the Ed for ETOH detox.   Reports drinking 1.75 ml of Vodka daily for last 7 days since relapsing.    Patient reports last use was 8 hours before coming to the ED.Pt MARION: 20  Reports hx of DT and hx of seizures.Suicidal (States had had suicidal thoughts since [place], denies attempts)  A  =  Assessment:   During admission interview, pt affect was blunted and flat. Patient reports feeling anxious, sweaty and endorses nauseous. Cooperative with admission interview and searches. Pt reports recent suicidal thoughts,denies current SI thoughts, pt contracts for safety. MSSA 11. Tachycardic on admission.  Pt administered Valium 10 mg valium, 25 mg hydroxyzine. Received 20 mg valium at the ED.    /88 (BP Location: Left arm, Patient Position: Sitting)   Pulse 111   Temp 98  F (36.7  C) (Oral)   Resp 16   Ht 1.93 m (6' 4\")   Wt 72.6 kg (160 lb)   SpO2 96%   BMI 19.48 kg/m      Utox: Positive for THC  CMP: Abnormalities: AST is 53   R =   Request or Recommendation:   Patient withdrawal will be monitored and treated using MSSA with valium  Pt will meet with psychiatry, internal medicine, and case management tomorrow.  Patient states he is interested with in patient treatment.  "

## 2023-12-28 NOTE — PLAN OF CARE
Problem: Alcohol Withdrawal  Goal: Alcohol Withdrawal Symptom Control  Outcome: Progressing     Problem: Sleep Disturbance  Goal: Adequate Sleep/Rest  Outcome: Progressing   Goal Outcome Evaluation:    The Pt slept okay after getting Trazodone for the second time; his MSSA scores were 5 and 8 He received Valium 5 mg.The fifteen-minute safety checks are in progress with no related incidents.

## 2023-12-28 NOTE — H&P
History and Physical    Goran Redding MRN# [id number]   Age: 23 year old YOB: 2000     Date of Admission:  12/27/2023          Contacts:     PCP - Dr. Cooper - [facility]         Diagnoses:     Alcohol use disorder, severe, dependence  Alcohol withdrawal  History of alcohol withdrawal seizures  Nicotine use disorder  Generalized anxiety disorder  PTSD  Rule out gambling addiction  Historical diagnosis of ADHD  Migraine headaches         Recommendations:     Admit to Unit: [address] [place] [address]    Attending Physician: Dr. Rahman, under the direct care of Tracy Valdez Np    Patient is voluntary.    Routine lab studies have been requested.    Monitor for target symptoms.     Provide a safe environment and therapeutic milieu.     Internal medicine consult.    MSSA with Valium.    Medications:  Begin Remeron 15 mg at HS.  PRN Trazodone is available for insomnia.  PRN Hydroxyzine is available for anxiety.      He has a medical cannabis card and states that he has severe migraines if he does not use cannabis.  He is open to a 30-day residential CD treatment if he can use some form of medical cannabis.  He is interested in therapy.        Attestation:  Patient has been seen and evaluated by me, Bri Valdez, APRN CNP  The patient was counseled on nature of illness and treatment plan/options  Care was coordinated with treatment team  Total time > 75 minutes         Chief Complaint:     History is obtained from the patient and electronic health record.  ED notes, outpatient records, and records from previous hospitalizations were reviewed.      Alcohol detox         History of Present Illness:        Goran Redding is a 23-year-old male admitted to [address] on 12/27/2023.  He was admitted as a voluntary patient through the ED due to alcohol use disorder and withdrawal.  He was sober for several months, and he relapsed a week prior to admission and was consuming 1.75  "liters of vodka daily.  Breathalyzer was 0.197.  UTOX was positive for cannabinoids.  He reports smoking medical cannabis daily for treatment of migraine headaches.  He also reported suicidal ideation, but only when he is \"beyond wasted.\"  He said that [place] was stressful because he was alone.  He was seen for a therapy intake in 8/2023 and was referred to psychiatry but missed 2 scheduled intakes.  PTA medications include only OTC vitamins.           Psychiatric Review of Systems:     He reports his mood has been \"fine.\"  He says he only has suicidal thoughts when he is very intoxicated.  He reports difficulty sleeping with middle insomnia.  \"I feel like I haven't had REM sleep in a year.\"  His appetite is \"really bad.\"  He denies recent weight changes.  His energy is \"fine.\"  Motivation has been low.  He often feels anxious.  He says he worries about \"a lot.\"  He feels irritable.  He has racing thoughts.  He feels restless.  He reports difficulty focusing on \"certain things.\"  He began experiencing panic attacks about 6 months ago.  He experiences a couple per week.  He experiences them when he feels overwhelmed or has flashbacks to past trauma.  Panic attacks are characterized as restlessness, shaking his hands, and feeling extremely irritable.  He reports auditory hallucinations when he is \"wasted,\" most recently a few days ago.  He reports a history of traumatic events.  He has intrusive thoughts, flashbacks, avoidance behaviors, hypervigilance and difficulty trusting others.  He denies homicidal ideation.  He reports a history of excessive gambling, most recently 3-4 months ago when he gambled $940.  He says he has gambled at the [employer] 3 times in the past year and he no longer buys scratch off tickets because he knows his use has been problematic.           Medical Review of Systems:     He reports sweating, tremor and nausea.  A 10-point review of systems was completed and is otherwise negative with the " "exception of HPI.            Psychiatric History:     He has a history of anxiety and ADHD.  He has never been psychiatrically hospitalized.  He reports in the past he has \"broken shit\" when he was upset and he does have a history of getting into fights.  He denies any history of suicide attempts or self-injury.  He said he has taken medications in the past and they \"just made me crazy.\"  He cannot recall medications he tried.  \"It was a long time ago,\" in high school.  He has never seen a therapist regularly.  \"It's hard for me to accept help like that.\"           Substance Use History:     He reports \"drugs in general\" became problematic at age 12 when he prescribed Oxycodone 40 mg x 7 years for migraine headaches.  He said he started abusing the Oxycodone within a year of it being prescribed.  His most recent use of opioids was 2020.  He was on Suboxone in the past.  He reports alcohol use became problematic at age 13 when he would run out of Oxycodone and drank alcohol instead.  After months of sobriety, he relapsed a week prior to admission and was consuming 1.75 L of vodka daily.  He reports progressive use, loss of control, continued use in spite of consequences, tolerance, withdrawal, cravings, and consuming more and longer than intended.  He has a history of withdrawal seizures but not DTs.  He doesn't believe he has taken Naltrexone, Campral or Antabuse.  He was hospitalized at [facility] for alcohol withdrawal in 8/2023.  He vapes nicotine.  In the past he has been to [employer].  He has been to [employer] and found it beneficial but is selective about the groups he attends.            Past Medical History:     Henoch-Schonlein purpura  Migraine headaches  Allergic rhinitis  Acne  Alcohol withdrawal seizures  Concussions, most recently 2020         Past Surgical History:     PE tubes x 3  Adenoidectomy          Allergies:     No known allergies         Medications:      fish oil-omega-3 fatty acids (OMEGA-3 " "FISH OIL) 1000 MG capsule Take 1 capsule by mouth daily    vitamin B complex with vitamin C (VITAMIN  B COMPLEX) tablet Take 1 tablet by mouth daily    vitamin B1 (THIAMINE) 100 MG tablet Take 1 tablet by mouth daily    zinc gluconate 50 MG tablet Take 50 mg by mouth daily          Social History:     He [place]\" in [place].  He was raised by \"a bunch of different people.\"  His parents were involved \"hit or miss.\"  He has a history of trauma which he declines to discuss.  He left school at age 13.  He works full-time as a .  He lives alone in a duplex he rents.  He is \"sort of\" in a relationship.  He does not have children.  He has had 3 DWIs.  He had a felony drug charge at age 15 and was tried as an adult.  He is on probation.          Family History:     He reports his entire maternal side of the family and half his paternal side of the family have a history of substance abuse.  His parents stopped using drugs but continue to consume some alcohol.  He reports \"a lot\" of mental illness in his family, including severe depression and bipolar disorder.  No family history of suicides.           Labs:      Latest Reference Range & Units 12/27/23 12:17 12/27/23 13:13 12/27/23 13:19   Sodium 135 - 145 mmol/L  138    Potassium 3.4 - 5.3 mmol/L  4.0    Chloride 98 - 107 mmol/L  95 (L)    Carbon Dioxide (CO2) 22 - 29 mmol/L  27    Urea Nitrogen 6.0 - 20.0 mg/dL  13.3    Creatinine 0.67 - 1.17 mg/dL  0.79    GFR Estimate >60 mL/min/1.73m2  >90    Calcium 8.6 - 10.0 mg/dL  10.1 (H)    Anion Gap 7 - 15 mmol/L  16 (H)    Magnesium 1.7 - 2.3 mg/dL  1.9    Albumin 3.5 - 5.2 g/dL  5.4 (H)    Protein Total 6.4 - 8.3 g/dL  8.3    Alkaline Phosphatase 40 - 150 U/L  79    ALT 0 - 70 U/L  42    AST 0 - 45 U/L  53 (H)    Bilirubin Total <=1.2 mg/dL  1.3 (H)    Glucose 70 - 99 mg/dL  83    WBC 4.0 - 11.0 10e3/uL  6.8    Hemoglobin 13.3 - 17.7 g/dL  17.8 (H)    Hematocrit 40.0 - 53.0 %  51.6    Platelet " Count 150 - 450 10e3/uL  333    RBC Count 4.40 - 5.90 10e6/uL  5.81    MCV 78 - 100 fL  89    MCH 26.5 - 33.0 pg  30.6    MCHC 31.5 - 36.5 g/dL  34.5    RDW 10.0 - 15.0 %  12.6    % Neutrophils %  62    % Lymphocytes %  29    % Monocytes %  7    % Eosinophils %  0    % Basophils %  2    Absolute Basophils 0.0 - 0.2 10e3/uL  0.1    Absolute Eosinophils 0.0 - 0.7 10e3/uL  0.0    Absolute Immature Granulocytes <=0.4 10e3/uL  0.0    Absolute Lymphocytes 0.8 - 5.3 10e3/uL  2.0    Absolute Monocytes 0.0 - 1.3 10e3/uL  0.5    % Immature Granulocytes %  0    Absolute Neutrophils 1.6 - 8.3 10e3/uL  4.2    Absolute NRBCs 10e3/uL  0.0    NRBCs per 100 WBC <1 /100  0    Alcohol Breath Test 0.00 - 0.01  0.197 !     Amphetamine Qual Urine Screen Negative    Screen Negative   Fentanyl Qual Urine Screen Negative    Screen Negative   Cocaine Urine Screen Negative    Screen Negative   Benzodiazepine Urine Screen Negative    Screen Negative   Opiates Qualitative Urine Screen Negative    Screen Negative   PCP Urine Screen Negative    Screen Negative   Cannabinoids Qual Urine Screen Negative    Screen Positive !   Barbiturates Qual Urine Screen Negative    Screen Negative   Alcohol ethyl <=0.01 g/dL  0.20 (H)           Psychiatric Examination:     Appearance:  awake, alert and dressed in hospital scrubs  Attitude:  cooperative  Eye Contact:  fair  Mood:  anxious  Affect:  mood congruent  Speech:  clear, coherent  Psychomotor Behavior:  no evidence of tardive dyskinesia, dystonia, or tics  Thought Process:  linear and goal oriented  Associations:  no loose associations  Thought Content:  no evidence of suicidal ideation or homicidal ideation and no evidence of psychotic thought  Insight:  fair  Judgment:  fair  Oriented to:  date, time, person, and place  Attention Span and Concentration:  fair  Recent and Remote Memory:  fair  Language: intact, fluent English  Fund of Knowledge:  appropriate  Muscle Strength and Tone:  normal  Gait and  "Station:   normal      /67 (BP Location: Right arm, Patient Position: Prone, Cuff Size: Adult Regular)   Pulse 51   Temp 97.8  F (36.6  C) (Oral)   Resp 16   Ht 1.93 m (6' 4\")   Wt 72.6 kg (160 lb)   SpO2 95%   BMI 19.48 kg/m           Physical Exam:     Please refer to the physical exam completed by Dr. Barcenas in the ED on 12/27/2023:    HENT:      Head: Normocephalic and atraumatic.      Nose: No congestion or rhinorrhea.   Eyes:      General: No scleral icterus.        Right eye: No discharge.         Left eye: No discharge.   Cardiovascular:      Rate and Rhythm: Normal rate and regular rhythm.      Heart sounds: Normal heart sounds.   Pulmonary:      Effort: Pulmonary effort is normal.      Breath sounds: Normal breath sounds.   Abdominal:      General: There is no distension.      Palpations: Abdomen is soft. There is no mass.      Tenderness: There is no abdominal tenderness. There is no guarding or rebound.      Hernia: No hernia is present.   Musculoskeletal:         General: No signs of injury. Normal range of motion.      Cervical back: Normal range of motion.   Skin:     General: Skin is warm and dry.      Coloration: Skin is not jaundiced.   Neurological:      General: No focal deficit present.      Mental Status: He is alert and oriented to person, place, and time.   Psychiatric:         Attention and Perception: Attention and perception normal.         Mood and Affect: Mood is anxious and depressed.         Speech: Speech normal.         Behavior: Behavior normal. Behavior is cooperative.         Thought Content: Thought content normal.         Cognition and Memory: Cognition and memory normal.         Judgment: Judgment normal.   " with patient

## 2023-12-28 NOTE — PLAN OF CARE
Behavioral Team Discussion: (12/28/2023)    Continued Stay Criteria/Rationale: Patient admitted for  Alcohol Use Disorder.  Plan: The following services will be provided to the patient; psychiatric assessment, medication management, therapeutic milieu, individual and group support, and skills groups.   Participants: 3A Provider: Dr. Terrell Rahman MD; 3A RN: Lamonte Hunt RN; 3A CM's: Patrick Jewell.  Summary/Recommendation: Providers will assess today for treatment recommendations, discharge planning, and aftercare plans. CM will meet with pt for discharge planning.   Medical/Physical: Patient reports a history of withdrawal seizures and Dts, patient denies any other medical or physical concerns at this time.   Precautions:   Behavioral Orders   Procedures    Code 1 - Restrict to Unit    Routine Programming     As clinically indicated    Seizure precautions    Status 15     Every 15 minutes.    Withdrawal precautions     Rationale for change in precautions or plan: N/A  Progress: Initial.    ASAM Dimension Scale Ratings:  Dimension 1: 3 Client tolerates and corinne with withdrawal discomfort poorly. Client has severe intoxication, such that the client endangers self or others, or intoxication has not abated with less intensive levels of services. Client displays severe signs and symptoms; or risk of severe, but manageable withdrawal; or withdrawal worsening despite detox at less intensive level.  Dimension 2: 1 Client tolerates and corinne with physical discomfort and is able to get the services that the client needs.  Dimension 3: 2 Client has difficulty with impulse control and lacks coping skills. Client has thoughts of suicide or harm to others without means; however, the thoughts may interfere with participation in some treatment activities. Client has difficulty functioning in significant life areas. Client has moderate symptoms of emotional, behavioral, or cognitive problems. Client is able to participate in  most treatment activities.  Dimension 4: 3 Client displays inconsistent compliance, minimal awareness of either the client's addiction or mental disorder, and is minimally cooperative.  Dimension 5: 3 Client has poor recognition and understanding of relapse and recidivism issues and displays moderately high vulnerability for further substance use or mental health problems. Client has few coping skills and rarely applies coping skills.  Dimension 6: 3 Client is not engaged in structured, meaningful activity and the client's peers, family, significant other, and living environment are unsupportive, or there is significant criminal justice system involvement.

## 2023-12-28 NOTE — PROGRESS NOTES
2200: assessment: MSSA 5, Reports mild anxiety. Denies other symptoms. Pulse rated 95 faftter Atenol 50 mg from previous reading of 111.    ./77    Pulse 95   Temp 98.5  F (36.9  C) (Oral)   Resp 16     SpO2 97%

## 2023-12-28 NOTE — CONSULTS
[facility]  Internal Medicine Consult     Goran Redding MRN# [id number]   Age: 23 year old YOB: 2000     Date of Admission: 12/27/2023  Date of Consult: 12/28/2023    Primary Care Provider: [facility]    Requesting Service: Behavioral Health - Terrell Rahman MD  Reason for Consult: General Medical Evaluation      SUBJECTIVE   CC:   Fatigue    Assessment and Plan/Recommendations:     Goran Redding is a 23 year old man with a history of alcohol abuse disorder and opiate use disorder, migraines.  Pt was admitted on 12/27/23 to  for medical management of withdrawal from alcohol.    #Alcohol withdrawal, hx of alcohol use disorder   Patient relapsed 7 days ago and has been drinking 1.75 L of vodka daily.  Last drink was 8 hours prior to presentation to ED.  Endorses a history of withdrawal seizure and delirium tremens in the past.  Uses THC medicinally for hemiplegic migraines. MSSA 5 this shift.   -Continue MSSA   -Folvite, multi-vites, thiamine supplementation   -Further management per Psychiatry     #Migraines-hemiplegic   Hx of hemiplegic migraines that began when he was 10 years old. He will develop a headache, his R side of his face will become numb and spreads to entire R side causing hemiplegia. He would also have headaches daily. Has resolved with the use of medicinal marijuana.   -Continue PTA regimen of medicinal THC at discharge    #Transaminitis  #Hyperbilirubinemia  #Alcoholic hepatitis   AST 53, ALT 42, T bili 1.3, alk phos 79. Expect will trend down with cessation of alcohol. Denies abdominal pain, nausea, emesis, diarrhea. Recheck CMP AST and ALT normal and T. Bili 2.2, expected results with alcoholic hepatitis. RUQ pain resolved. No jaundice   -monitor as outpatient at primary care     #Anion gap metabolic acidosis  Anion gap 16. Associated with significant alcohol ingestion and dehydration.   -alcohol cessation  -encourage fluid  intake      Currently, medically stable and internal medicine will sign off. Please contact if future questions or concerns arise. Thank you for the opportunity to be a part of this patient's care.      BLANCO Zarate CNP  Internal Medicine [facility] Hospitalist  Page job code [id number] (), 3631 (3A), or 2257 ([facility] and )  Text paging via [employer] is appreciated  December 28, 2023         HPI:   Goran Redding is a 23 year old man with a history of alcohol abuse disorder and opiate use disorder, migraines.  Pt was admitted on 12/27/23 to 3A for medical management of withdrawal from alcohol.  Patient relapsed 7 days ago and has been drinking 1.75 L of vodka daily.  Last drink was 8 hours prior to presentation to ED.  Endorses a history of withdrawal seizure and delirium tremens in the past.  Uses THC medicinally for hemiplegic migraines.  Pt states he is feeling much better. He has a hs of opioid use that was prescribed to him for migraines for 7 years. He feels like he has replaced opioids with alcohol abuse. He has a good support system of family and friends. He had significant RUQ pain on admission that has resolved. We discussed importance of alcohol cessation and concern for permanent liver damage. He takes a lot of supplements that he feels have helped his body detox. Pt denies other needs or questions for Medicine at this time.        Past Medical History:     Past Medical History:   Diagnosis Date    Henoch-Schonlein purpura (H)     2006  no recur.         Reviewed and updated in [employer].     Past Surgical History:      Past Surgical History:   Procedure Laterality Date    pe tubes x 3           Social History:     Social History     Tobacco Use    Smoking status: Every Day    Smokeless tobacco: Never   Vaping Use    Vaping Use: Never used   Substance Use Topics    Alcohol use: No    Drug use: No     Comment: THC prescription        Family History:     Family History   Problem Relation Age of Onset    Heart  "Disease Mother         tachycardia    Neurologic Disorder Mother         adhd    Cancer - colorectal Maternal Grandmother 62        Colon Cancer    Hypertension Maternal Grandfather     Heart Disease Maternal Grandfather 56        Massive Heart Attack    Cancer Maternal Uncle 50        Eshopahgeal, Colon Cancer    Neurologic Disorder Brother         autism    Neurologic Disorder Brother         motor tic         Allergies:   No Known Allergies      Medications:   Reviewed. Please see MAR     Review of Systems:   10 point ROS of systems including Constitutional, Eyes, Respiratory, Cardiovascular, Gastroenterology, Genitourinary, Integumentary, Muscularskeletal, Psychiatric were all negative except for pertinent positives noted in my HPI.    OBJECTIVE   Physical Exam:   Vitals were reviewed  Blood pressure 122/67, pulse 65, temperature 97.3  F (36.3  C), temperature source Temporal, resp. rate 14, height 1.93 m (6' 4\"), weight 72.6 kg (160 lb), SpO2 98%.  General: Alert and oriented x3, mild distress  HEENT: Anicteric sclera, MMM  Cardiovascular: RRR, S1S2. No murmur noted  Lungs: CTAB without wheezing or crackles   GI: Abdomen soft, non-tender with bowel sounds present. No guarding or rebound   Vascular: no peripheral edema, distal pulses palpable  Neurologic: No focal deficits, CN II-XII grossly intact  Skin: No jaundice, rashes, or lesions        Data:        Lab Results   Component Value Date     12/27/2023    Lab Results   Component Value Date    CHLORIDE 95 12/27/2023    Lab Results   Component Value Date    BUN 13.3 12/27/2023      Lab Results   Component Value Date    POTASSIUM 4.0 12/27/2023    Lab Results   Component Value Date    CO2 27 12/27/2023    Lab Results   Component Value Date    CR 0.79 12/27/2023        Lab Results   Component Value Date    WBC 6.8 12/27/2023    HGB 17.8 (H) 12/27/2023    HCT 51.6 12/27/2023    MCV 89 12/27/2023     12/27/2023     Lab Results   Component Value Date    " WBC 6.8 2023           Medical Decision Makin MINUTES SPENT BY ME on the date of service doing chart review, history, exam, documentation & further activities per the note.

## 2023-12-29 VITALS
DIASTOLIC BLOOD PRESSURE: 89 MMHG | TEMPERATURE: 97.3 F | SYSTOLIC BLOOD PRESSURE: 130 MMHG | BODY MASS INDEX: 19.48 KG/M2 | HEIGHT: 76 IN | WEIGHT: 160 LBS | RESPIRATION RATE: 16 BRPM | HEART RATE: 55 BPM | OXYGEN SATURATION: 100 %

## 2023-12-29 PROCEDURE — 250N000013 HC RX MED GY IP 250 OP 250 PS 637: Performed by: PSYCHIATRY & NEUROLOGY

## 2023-12-29 PROCEDURE — 99239 HOSP IP/OBS DSCHRG MGMT >30: CPT | Performed by: NURSE PRACTITIONER

## 2023-12-29 PROCEDURE — 250N000013 HC RX MED GY IP 250 OP 250 PS 637: Performed by: NURSE PRACTITIONER

## 2023-12-29 PROCEDURE — 250N000013 HC RX MED GY IP 250 OP 250 PS 637: Performed by: EMERGENCY MEDICINE

## 2023-12-29 RX ORDER — FOLIC ACID 1 MG/1
1 TABLET ORAL DAILY
Qty: 30 TABLET | Refills: 1 | Status: SHIPPED | OUTPATIENT
Start: 2023-12-29

## 2023-12-29 RX ORDER — ACAMPROSATE CALCIUM 333 MG/1
666 TABLET, DELAYED RELEASE ORAL 3 TIMES DAILY
Qty: 180 TABLET | Refills: 1 | Status: SHIPPED | OUTPATIENT
Start: 2023-12-29

## 2023-12-29 RX ORDER — MULTIPLE VITAMINS W/ MINERALS TAB 9MG-400MCG
1 TAB ORAL DAILY
Qty: 30 TABLET | Refills: 1 | Status: SHIPPED | OUTPATIENT
Start: 2023-12-29

## 2023-12-29 RX ORDER — ACAMPROSATE CALCIUM 333 MG/1
666 TABLET, DELAYED RELEASE ORAL 3 TIMES DAILY
Status: DISCONTINUED | OUTPATIENT
Start: 2023-12-29 | End: 2023-12-29 | Stop reason: HOSPADM

## 2023-12-29 RX ORDER — HYDROXYZINE HYDROCHLORIDE 50 MG/1
50 TABLET, FILM COATED ORAL EVERY 4 HOURS PRN
Qty: 60 TABLET | Refills: 1 | Status: SHIPPED | OUTPATIENT
Start: 2023-12-29

## 2023-12-29 RX ORDER — MIRTAZAPINE 15 MG/1
15 TABLET, FILM COATED ORAL AT BEDTIME
Qty: 30 TABLET | Refills: 1 | Status: SHIPPED | OUTPATIENT
Start: 2023-12-29

## 2023-12-29 RX ADMIN — ZINC SULFATE 220 MG (50 MG) CAPSULE 220 MG: CAPSULE at 08:07

## 2023-12-29 RX ADMIN — ACAMPROSATE CALCIUM 666 MG: 333 TABLET, DELAYED RELEASE ORAL at 08:07

## 2023-12-29 RX ADMIN — Medication 1 TABLET: at 08:07

## 2023-12-29 RX ADMIN — B-COMPLEX W/ C & FOLIC ACID TAB 1 TABLET: TAB at 08:07

## 2023-12-29 RX ADMIN — NICOTINE POLACRILEX 4 MG: 4 GUM, CHEWING BUCCAL at 08:47

## 2023-12-29 RX ADMIN — THIAMINE HCL TAB 100 MG 100 MG: 100 TAB at 08:07

## 2023-12-29 RX ADMIN — NICOTINE POLACRILEX 4 MG: 4 GUM, CHEWING BUCCAL at 06:54

## 2023-12-29 RX ADMIN — FOLIC ACID 1 MG: 1 TABLET ORAL at 08:07

## 2023-12-29 RX ADMIN — Medication 1 G: at 08:07

## 2023-12-29 ASSESSMENT — ANXIETY QUESTIONNAIRES
1. FEELING NERVOUS, ANXIOUS, OR ON EDGE: NEARLY EVERY DAY
IF YOU CHECKED OFF ANY PROBLEMS ON THIS QUESTIONNAIRE, HOW DIFFICULT HAVE THESE PROBLEMS MADE IT FOR YOU TO DO YOUR WORK, TAKE CARE OF THINGS AT HOME, OR GET ALONG WITH OTHER PEOPLE: VERY DIFFICULT
7. FEELING AFRAID AS IF SOMETHING AWFUL MIGHT HAPPEN: NEARLY EVERY DAY
6. BECOMING EASILY ANNOYED OR IRRITABLE: NEARLY EVERY DAY
3. WORRYING TOO MUCH ABOUT DIFFERENT THINGS: NEARLY EVERY DAY
GAD7 TOTAL SCORE: 21
2. NOT BEING ABLE TO STOP OR CONTROL WORRYING: NEARLY EVERY DAY
5. BEING SO RESTLESS THAT IT IS HARD TO SIT STILL: NEARLY EVERY DAY
4. TROUBLE RELAXING: NEARLY EVERY DAY
GAD7 TOTAL SCORE: 21

## 2023-12-29 ASSESSMENT — ACTIVITIES OF DAILY LIVING (ADL)
ADLS_ACUITY_SCORE: 28

## 2023-12-29 ASSESSMENT — PATIENT HEALTH QUESTIONNAIRE - PHQ9: SUM OF ALL RESPONSES TO PHQ QUESTIONS 1-9: 25

## 2023-12-29 NOTE — CONSULTS
Met with pt for CD Consult and completed GISELLA Comprehensive Assessment recommending residential LOC.  Pt is requesting referral to Critical access hospital and any additional residential programs that will consider medical cannabis Rx.  Unit coordinator to assist with referrals.    Recommendations:  1)  Complete a Residential CD Treatment Program  2)  Abstain from all mood-altering chemicals unless prescribed by a licensed provider.   3)  Attend, at minimum, 2 weekly support group meetings, such as 12 step based (AA/NA), SMART Recovery, Health Realizations, and/or Refuge Recovery meetings.     4)  Actively work with a male mentor/sponsor on a weekly basis.   5)  Follow all the recommendations of your treatment/medical providers.  6)  Remain law abiding and follow all recommendations of the Courts/PO.  7)  Patient may benefit from obtaining a full mental health evaluation.    Clinical Substantiation:  Patient has been unable to maintain abstinence from alcohol while living at his current home environment, lacks long-term sober maintenance skills, lacks sober coping skills, lacks awareness regarding the disease model of addiction, lacks a sober peer support network, and has mental health symptoms which are exacerbated by substance abuse.    Referrals/ Alternatives:  [facility]  [address] [place] [address]  P: [phone]  F: [phone]    DAANES Assessment ID: [id number]     GISELLA consult completed by:   AMMON Trejo, [facility]  Substance Use Disorder Evaluation Counselor  E-mail Address: [email or web address] Mental Health and Addiction Services Consult & Liaison Department  [facility], [address] [place] [address]

## 2023-12-29 NOTE — PLAN OF CARE
The Pt is alert and oriented x4. He came out of detox and ready for home. His mood was calm, and his affect was full range. He denied SI/HI, AVHs, pain, and all other mental health-related SxS except that his anxiety and depression were low. Pt says his sleeping pattern, appetite, and bathroom use were okay.  Regarding his safety plan after his release, he said he had no access to guns. His family and friends are his support system. He will call someone or return to the hospital if he feels the urge to hurt himself. The Writer and the patient discussed and reviewed his discharge summary; he verbalized understanding or comprehension of the teaching/education, including but not limited to his medication administration. The Pt received his belongings and medications. The Writer reminded and firmly instructed him to see his psychiatrist within three weeks and to use the resources provided in the discharge summary as needed. A team member escorted him to the elevator entrance. He will use Uber services for transport to his house. He left at 11 am.

## 2023-12-29 NOTE — PROGRESS NOTES
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 alcohol, or recover from its effects.  4.) Craving, or a strong desire or urge to use alcohol/drug  5.) Recurrent alcohol/drug use resulting in a failure to fulfill major role obligations at work, school or home.  6.) Continued alcohol use despite having persistent or recurrent social or interpersonal problems caused or exacerbated by the effects of alcohol/drug.  7.) Important social, occupational, or recreational activities are given up or reduced because of alcohol/drug use.  8.) Recurrent alcohol/drug use in situations in which it is physically hazardous.  9.) Alcohol/drug use is continued despite knowledge of having a persistent or recurrent physical or psychological problem that is likely to have been caused or exacerbated by alcohol.  10.) Tolerance, as defined by either of the following: A need for markedly increased amounts of alcohol/drug to achieve intoxication or desired effect.  11.) Withdrawal, as manifested by either of the following: The characteristic withdrawal syndrome for alcohol/drug (refer to Criteria A and B of the criteria set for alcohol/drug withdrawal).    Specify if: In early remission:  After full criteria for alcohol/drug use disorder were previously met, none of the criteria for alcohol/drug use disorder have been met for at least  3 months but for less than 12 months (with the exception that Criterion A4,  Craving or a strong desire or urge to use alcohol/drug  may be met).     In sustained remission:   After full criteria for alcohol use disorder were previously met, non of the criteria for alcohol/drug use disorder have been met at any time during a period of 12 months or longer (with the exception that Criterion A4,  Craving or strong desire or urge to use alcohol/drug  may be met).     Specify if:   This additional specifier is used if the individual is in an environment where access to alcohol is restricted.    Mild: Presence of 2-3 symptoms  Moderate: Presence of 4-5 symptoms  Severe: Presence of 6 or more symptoms    Collateral information: GISELLA Collateral Info: Sufficient information is obtained from the patient to support diagnosis and recommendations. Contact with a collateral sources is not required.    Clinical Substantiation:  Patient has been unable to maintain abstinence from alcohol while living at his current home environment, lacks long-term sober maintenance skills, lacks sober coping skills, lacks awareness regarding the disease model of addiction, lacks a sober peer support network, and has mental health symptoms which are exacerbated by substance abuse.    Referrals/ Alternatives:  DOROTHY  [facility] [place] [address]  P: [phone]  F: [phone]    DAANES Assessment ID: [id number]     GISELLA consult completed by:   AMMON Trejo, Formerly named [facility]  Substance Use Disorder Evaluation Counselor  E-mail Address: [email or web address] Consult & Liaison Department  [facility], [address] [place] [address]     *Due to regulation of Title 42 of the Code of Federal Regulations (CFR) Part 2: Confidentiality laws apply to this note and the information wherein.  Thus, this note cannot be copy and pasted into any  other health care staff's note nor can it be included in general medical records sent to ANY outside agency without the patient's written consent.

## 2023-12-29 NOTE — DISCHARGE SUMMARY
"Psychiatric Discharge Summary    Goran Redding MRN# [id number]   Age: 23 year old YOB: 2000     Date of Admission:  12/27/2023  Date of Discharge:  12/29/2023  Admitting Physician:  Terrell Rahman MD  Discharge Physician:  BLANCO Devlin CNP (Contact: [phone])         Event Leading to Hospitalization:      From H&P 12/28/2023:    Goran Redding is a 23-year-old male admitted to [address] on 12/27/2023.  He was admitted as a voluntary patient through the ED due to alcohol use disorder and withdrawal.  He was sober for several months, and he relapsed a week prior to admission and was consuming 1.75 liters of vodka daily.  Breathalyzer was 0.197.  UTOX was positive for cannabinoids.  He reports smoking medical cannabis daily for treatment of migraine headaches.  He also reported suicidal ideation, but only when he is \"beyond wasted.\"  He said that [place] was stressful because he was alone.  He was seen for a therapy intake in 8/2023 and was referred to psychiatry but missed 2 scheduled intakes.  PTA medications include only OTC vitamins.      He reports his mood has been \"fine.\"  He says he only has suicidal thoughts when he is very intoxicated.  He reports difficulty sleeping with middle insomnia.  \"I feel like I haven't had REM sleep in a year.\"  His appetite is \"really bad.\"  He denies recent weight changes.  His energy is \"fine.\"  Motivation has been low.  He often feels anxious.  He says he worries about \"a lot.\"  He feels irritable.  He has racing thoughts.  He feels restless.  He reports difficulty focusing on \"certain things.\"  He began experiencing panic attacks about 6 months ago.  He experiences a couple per week.  He experiences them when he feels overwhelmed or has flashbacks to past trauma.  Panic attacks are characterized as restlessness, shaking his hands, and feeling extremely irritable.  He reports auditory hallucinations when he is " "\"wasted,\" most recently a few days ago.  He reports a history of traumatic events.  He has intrusive thoughts, flashbacks, avoidance behaviors, hypervigilance and difficulty trusting others.  He denies homicidal ideation.  He reports a history of excessive gambling, most recently 3-4 months ago when he gambled $940.  He says he has gambled at the [employer] 3 times in the past year and he no longer buys scratch off tickets because he knows his use has been problematic.      See full admission note by Tracy Valdez NP 12/28/2023 for details.         Diagnoses:     Alcohol use disorder, severe, dependence  History of alcohol withdrawal seizures  Nicotine use disorder  Generalized anxiety disorder  PTSD  Rule out gambling addiction  Historical diagnosis of ADHD  Migraine headaches  Transaminitis  Hyperbilirubinemia  Alcoholic hepatitis         Labs:      Latest Reference Range & Units 12/27/23 12:17 12/27/23 13:13 12/27/23 13:19 12/28/23 07:30   Sodium 135 - 145 mmol/L  138  139   Potassium 3.4 - 5.3 mmol/L  4.0  4.0   Chloride 98 - 107 mmol/L  95 (L)  98   Carbon Dioxide (CO2) 22 - 29 mmol/L  27  32 (H)   Urea Nitrogen 6.0 - 20.0 mg/dL  13.3  17.6   Creatinine 0.67 - 1.17 mg/dL  0.79  0.91   GFR Estimate >60 mL/min/1.73m2  >90  >90   Calcium 8.6 - 10.0 mg/dL  10.1 (H)  10.0   Anion Gap 7 - 15 mmol/L  16 (H)  9   Magnesium 1.7 - 2.3 mg/dL  1.9     Albumin 3.5 - 5.2 g/dL  5.4 (H)  4.6   Protein Total 6.4 - 8.3 g/dL  8.3  6.8   Alkaline Phosphatase 40 - 150 U/L  79  70   ALT 0 - 70 U/L  42  32   AST 0 - 45 U/L  53 (H)  35   Bilirubin Total <=1.2 mg/dL  1.3 (H)  2.2 (H)   Cholesterol <200 mg/dL    207 (H)   GGT 8 - 61 U/L    26   Glucose 70 - 99 mg/dL  83  86   HDL Cholesterol >=40 mg/dL    150   LDL Cholesterol Calculated <=100 mg/dL    44   Non HDL Cholesterol <130 mg/dL    57   Triglycerides <150 mg/dL    65   TSH 0.30 - 4.20 uIU/mL    1.15   WBC 4.0 - 11.0 10e3/uL  6.8     Hemoglobin 13.3 - 17.7 g/dL  17.8 (H)   "   Hematocrit 40.0 - 53.0 %  51.6     Platelet Count 150 - 450 10e3/uL  333     RBC Count 4.40 - 5.90 10e6/uL  5.81     MCV 78 - 100 fL  89     MCH 26.5 - 33.0 pg  30.6     MCHC 31.5 - 36.5 g/dL  34.5     RDW 10.0 - 15.0 %  12.6     % Neutrophils %  62     % Lymphocytes %  29     % Monocytes %  7     % Eosinophils %  0     % Basophils %  2     Absolute Basophils 0.0 - 0.2 10e3/uL  0.1     Absolute Eosinophils 0.0 - 0.7 10e3/uL  0.0     Absolute Immature Granulocytes <=0.4 10e3/uL  0.0     Absolute Lymphocytes 0.8 - 5.3 10e3/uL  2.0     Absolute Monocytes 0.0 - 1.3 10e3/uL  0.5     % Immature Granulocytes %  0     Absolute Neutrophils 1.6 - 8.3 10e3/uL  4.2     Absolute NRBCs 10e3/uL  0.0     NRBCs per 100 WBC <1 /100  0     Alcohol Breath Test 0.00 - 0.01  0.197 !      Amphetamine Qual Urine Screen Negative    Screen Negative    Fentanyl Qual Urine Screen Negative    Screen Negative    Cocaine Urine Screen Negative    Screen Negative    Benzodiazepine Urine Screen Negative    Screen Negative    Opiates Qualitative Urine Screen Negative    Screen Negative    PCP Urine Screen Negative    Screen Negative    Cannabinoids Qual Urine Screen Negative    Screen Positive !    Barbiturates Qual Urine Screen Negative    Screen Negative    Alcohol ethyl <=0.01 g/dL  0.20 (H)            Consults:     Internal Medicine Consult 12/28/2023:    Goran Redding is a 23 year old man with a history of alcohol abuse disorder and opiate use disorder, migraines.  Pt was admitted on 12/27/23 to [facility] for medical management of withdrawal from alcohol.     #Alcohol withdrawal, hx of alcohol use disorder   Patient relapsed 7 days ago and has been drinking 1.75 L of vodka daily.  Last drink was 8 hours prior to presentation to ED.  Endorses a history of withdrawal seizure and delirium tremens in the past.  Uses THC medicinally for hemiplegic migraines. MSSA 5 this shift.   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 He will develop a headache, his R side of his face will become numb and spreads to entire R side causing hemiplegia. He would also have headaches daily. Has resolved with the use of medicinal marijuana.   -Continue PTA regimen of medicinal THC at discharge     #Transaminitis  #Hyperbilirubinemia  #Alcoholic hepatitis   AST 53, ALT 42, T bili 1.3, alk phos 79. Expect will trend down with cessation of alcohol. Denies abdominal pain, nausea, emesis, diarrhea. Recheck CMP AST and ALT normal and T. Bili 2.2, expected results with alcoholic hepatitis. RUQ pain resolved. No jaundice   -monitor as outpatient at primary care      #Anion gap metabolic acidosis  Anion gap 16. Associated with significant alcohol ingestion and dehydration.   -alcohol cessation  -encourage fluid intake     Currently, medically stable and internal medicine will sign off.          Hospital Course:     Goran Redding was admitted to Station [address] [place] [address] with attending Terrell Rahman MD, under the direct care of Tracy Valdez NP as a voluntary patient.  The patient was placed under status 15 (15 minute checks) to ensure patient safety.     MSSA protocol was initiated due to the patient's history of alcohol abuse and concern for withdrawal symptoms.  He received 30 mg of Valium on day 1 and 5 mg of Valium on day 2.  Thereafter his withdrawal subsided, and the MSSA was discontinued.      Remeron 15 mg at HS was initiated.  PRN Hydroxyzine 50 mg was initiated for anxiety.   Campral 666 mg TID was initiated.      Goran Redding did participate in groups and was visible in the milieu.  He appeared motivated for treatment and recovery.  Sleep was improved.  Appetite was fair.  Anxiety was reduced.      Goran Redding was released to home with plans to admit to [facility]. At the time of discharge Goran Redding was  determined to not be a danger to himself or others.          Discharge Medications:       Dose / Directions   acamprosate 333 MG EC tablet  Commonly known as: CAMPRAL      Dose: 666 mg  Take 2 tablets (666 mg) by mouth 3 times daily  Quantity: 180 tablet  Refills: 1     folic acid 1 MG tablet  Commonly known as: FOLVITE      Dose: 1 mg  Take 1 tablet (1 mg) by mouth daily  Quantity: 30 tablet  Refills: 1     hydrOXYzine HCl 50 MG tablet  Commonly known as: ATARAX     Dose: 50 mg  Take 1 tablet (50 mg) by mouth every 4 hours as needed for anxiety  Quantity: 60 tablet  Refills: 1     mirtazapine 15 MG tablet  Commonly known as: REMERON        Dose: 15 mg  Take 1 tablet (15 mg) by mouth at bedtime  Quantity: 30 tablet  Refills: 1     multivitamin w/minerals tablet      Dose: 1 tablet  Take 1 tablet by mouth daily  Quantity: 30 tablet  Refills: 1       fish oil-omega-3 fatty acids 1000 MG capsule      Dose: 1 capsule  Take 1 capsule by mouth daily       vitamin B complex with vitamin C tablet      Dose: 1 tablet  Take 1 tablet by mouth daily       vitamin B1 100 MG tablet  Commonly known as: THIAMINE      Dose: 1 tablet  Take 1 tablet by mouth daily       zinc gluconate 50 MG tablet      Dose: 50 mg  Take 50 mg by mouth daily         Where to get your medicines        These medications were sent to [facility] [address]  [address] [address]      Phone: [phone]   acamprosate 333 MG EC tablet  folic acid 1 MG tablet  hydrOXYzine HCl 50 MG tablet  mirtazapine 15 MG tablet  multivitamin w/minerals tablet         Psychiatric Examination:     Appearance:  awake, alert and dressed in hospital scrubs  Attitude:  cooperative  Eye Contact:  fair  Mood:  less anxious  Affect:  normal range  Speech:  clear, coherent  Psychomotor Behavior:  no evidence of tardive dyskinesia, dystonia, or tics  Thought Process:  linear and goal oriented  Associations:  no loose  "associations  Thought Content:  no evidence of suicidal ideation or homicidal ideation and no evidence of psychotic thought  Insight:  fair  Judgment:  fair  Oriented to:  date, time, person, and place  Attention Span and Concentration:  fair  Recent and Remote Memory:  fair  Language: intact, fluent English  Fund of Knowledge:  appropriate  Muscle Strength and Tone:  normal  Gait and Station:   normal      /75 (BP Location: Left arm, Patient Position: Supine, Cuff Size: Adult Regular)   Pulse 56   Temp 97.8  F (36.6  C) (Oral)   Resp 16   Ht 1.93 m (6' 4\")   Wt 72.6 kg (160 lb)   SpO2 96%   BMI 19.48 kg/m           Discharge Plan:     Per Discharge AVS:    Summary: You were admitted to [facility] on 12/27/23 for detoxification from alcohol.  A medical exam was performed that included lab work. You have met with a  and opted to attend [facility].  Please take care and make your recovery a daily priority, Goran!  It was a pleasure working with you and the entire treatment team here wishes you the very best in your recovery!     Recommendation:  Residential CD Treatment at [facility] or similar program. Refrain from use of mood altering substances.     Major Treatments, Procedures and Findings:  You were treated for alcohol detoxification using Valium. You completed a chemical dependency assessment. You had labs drawn and those results were reviewed with you. Please take a copy of your lab work with you to your next primary care provider appointment.    Symptoms to Report:  If you experience more anxiety, confusion, sleeplessness, deep sadness or thoughts of suicide, notify your treatment team or notify your primary care provider. IF ANY OF THE SYMPTOMS YOU ARE EXPERIENCING ARE A MEDICAL EMERGENCY CALL 911 IMMEDIATELY.     Lifestyle Adjustment: Adjust your lifestyle to get enough sleep, relaxation, exercise and good nutrition. Continue to develop healthy coping skills to decrease stress " and promote a sober living environment. Do not use mood altering substances including alcohol, illegal drugs or addictive medications other than what is currently prescribed.     Disposition: Home - referral placed to [facility]    Follow-up Appointment:     You declined to set up a follow-up appointment before leaving today, stating your insurance is changing on 1/1/2024. You promised to schedule an appointment once you switch to your new insurance. Please be seen within three weeks of release.       He was provided with a 30-day supply of medications plus one refill.  He was advised to take his medications as prescribed and to abstain from alcohol and illicit subustances.        Attestation:  The patient has been seen and evaluated by me, BLANCO Devlin CNP   Discharge time > 30 minutes

## 2023-12-29 NOTE — PLAN OF CARE
Goal Outcome Evaluation  Problem: Alcohol Withdrawal  Goal: Alcohol Withdrawal Symptom Control  Outcome: Progressing     Problem: Sleep Disturbance  Goal: Adequate Sleep/Rest  Outcome: Progressing     The Pt MSSA score was 3. He has not receive Valium for more than 24 hours. The writer took him out of detox during the night per policy. The Patient exhibited a typical respiratory pattern and slept well throughout the night. The team completed the 15-minute safety inspection without any issues.

## 2023-12-29 NOTE — CARE PLAN
12/28/23 2000   General Information   Art Directive other (see comments)     Art Therapy directive is to create a watercolor painting of self as a landscape, using metaphor to express aspects of self/create a personal self narrative.  Goals of directive: to identify personal strengths and goals, emotional expression, to express aspects of self.  Pt was an engaged participant, focused on task for the full duration of group. Pt finished artwork and briefly verbally processed with group. Pt sandra an image of a galaxy/outer space and a black hole. Pt referred to the black hole as symbolizing the his recent/current struggles.  Pts mood was calm.

## 2023-12-29 NOTE — PLAN OF CARE
Problem: Alcohol Withdrawal  Goal: Alcohol Withdrawal Symptom Control  Outcome: Progressing   Goal Outcome Evaluation:       Pt.was out and visible in the milieu during evening shift. He attended and participated in unit group activity. Affect was labile. Scored 3 on MSSA. No prn valium medication administered. He was compliant with his scheduled medications. No medication adverse side effects observed or reported. Pt.endorsed readiness to discharge the next day. Will continue to monitor.

## 2024-12-07 ENCOUNTER — HOSPITAL ENCOUNTER (INPATIENT)
Facility: CLINIC | Age: 24
LOS: 1 days | Discharge: LEFT BEFORE DISCHARGE | DRG: 894 | End: 2024-12-08
Attending: INTERNAL MEDICINE | Admitting: INTERNAL MEDICINE
Payer: COMMERCIAL

## 2024-12-07 ENCOUNTER — APPOINTMENT (OUTPATIENT)
Dept: GENERAL RADIOLOGY | Facility: CLINIC | Age: 24
DRG: 894 | End: 2024-12-07
Attending: INTERNAL MEDICINE

## 2024-12-07 PROBLEM — F10.10 ALCOHOL ABUSE: Status: ACTIVE | Noted: 2024-12-07

## 2024-12-07 LAB
ALBUMIN SERPL BCG-MCNC: 4.5 G/DL (ref 3.5–5.2)
ALLEN'S TEST: YES
ALP SERPL-CCNC: 68 U/L (ref 40–150)
ALT SERPL W P-5'-P-CCNC: 26 U/L (ref 0–70)
ANION GAP SERPL CALCULATED.3IONS-SCNC: 20 MMOL/L (ref 7–15)
APTT PPP: 37 SECONDS (ref 22–38)
AST SERPL W P-5'-P-CCNC: 37 U/L (ref 0–45)
BASE EXCESS BLDA CALC-SCNC: 0.4 MMOL/L (ref -3–3)
BASE EXCESS BLDV CALC-SCNC: 5 MMOL/L (ref -3–3)
BASOPHILS # BLD AUTO: 0.1 10E3/UL (ref 0–0.2)
BASOPHILS NFR BLD AUTO: 0 %
BILIRUB SERPL-MCNC: 0.3 MG/DL
BUN SERPL-MCNC: 13.5 MG/DL (ref 6–20)
CALCIUM SERPL-MCNC: 9.1 MG/DL (ref 8.8–10.4)
CHLORIDE SERPL-SCNC: 104 MMOL/L (ref 98–107)
COHGB MFR BLD: 99.9 % (ref 96–97)
CREAT SERPL-MCNC: 0.78 MG/DL (ref 0.67–1.17)
EGFRCR SERPLBLD CKD-EPI 2021: >90 ML/MIN/1.73M2
EOSINOPHIL # BLD AUTO: 0 10E3/UL (ref 0–0.7)
EOSINOPHIL NFR BLD AUTO: 0 %
ERYTHROCYTE [DISTWIDTH] IN BLOOD BY AUTOMATED COUNT: 13.9 % (ref 10–15)
ERYTHROCYTE [DISTWIDTH] IN BLOOD BY AUTOMATED COUNT: 14.2 % (ref 10–15)
ERYTHROCYTE [DISTWIDTH] IN BLOOD BY AUTOMATED COUNT: 14.2 % (ref 10–15)
GLUCOSE BLDC GLUCOMTR-MCNC: 113 MG/DL (ref 70–99)
GLUCOSE BLDC GLUCOMTR-MCNC: 85 MG/DL (ref 70–99)
GLUCOSE BLDC GLUCOMTR-MCNC: 88 MG/DL (ref 70–99)
GLUCOSE BLDC GLUCOMTR-MCNC: 93 MG/DL (ref 70–99)
GLUCOSE BLDC GLUCOMTR-MCNC: 95 MG/DL (ref 70–99)
GLUCOSE SERPL-MCNC: 82 MG/DL (ref 70–99)
HCO3 BLD-SCNC: 25 MMOL/L (ref 21–28)
HCO3 BLDV-SCNC: 28 MMOL/L (ref 21–28)
HCO3 SERPL-SCNC: 21 MMOL/L (ref 22–29)
HCT VFR BLD AUTO: 38.3 % (ref 40–53)
HCT VFR BLD AUTO: 40.1 % (ref 40–53)
HCT VFR BLD AUTO: 43.6 % (ref 40–53)
HGB BLD-MCNC: 12.8 G/DL (ref 13.3–17.7)
HGB BLD-MCNC: 12.9 G/DL (ref 13.3–17.7)
HGB BLD-MCNC: 14.2 G/DL (ref 13.3–17.7)
IMM GRANULOCYTES # BLD: 0.1 10E3/UL
IMM GRANULOCYTES NFR BLD: 1 %
INR PPP: 1.11 (ref 0.85–1.15)
LACTATE SERPL-SCNC: 0.9 MMOL/L (ref 0.7–2)
LACTATE SERPL-SCNC: 3.9 MMOL/L (ref 0.7–2)
LYMPHOCYTES # BLD AUTO: 1.7 10E3/UL (ref 0.8–5.3)
LYMPHOCYTES NFR BLD AUTO: 14 %
MAGNESIUM SERPL-MCNC: 1.9 MG/DL (ref 1.7–2.3)
MCH RBC QN AUTO: 26.5 PG (ref 26.5–33)
MCH RBC QN AUTO: 27.2 PG (ref 26.5–33)
MCH RBC QN AUTO: 27.2 PG (ref 26.5–33)
MCHC RBC AUTO-ENTMCNC: 32.2 G/DL (ref 31.5–36.5)
MCHC RBC AUTO-ENTMCNC: 32.6 G/DL (ref 31.5–36.5)
MCHC RBC AUTO-ENTMCNC: 33.4 G/DL (ref 31.5–36.5)
MCV RBC AUTO: 82 FL (ref 78–100)
MCV RBC AUTO: 82 FL (ref 78–100)
MCV RBC AUTO: 83 FL (ref 78–100)
MONOCYTES # BLD AUTO: 0.5 10E3/UL (ref 0–1.3)
MONOCYTES NFR BLD AUTO: 4 %
NEUTROPHILS # BLD AUTO: 10.1 10E3/UL (ref 1.6–8.3)
NEUTROPHILS NFR BLD AUTO: 81 %
NRBC # BLD AUTO: 0 10E3/UL
NRBC BLD AUTO-RTO: 0 /100
O2/TOTAL GAS SETTING VFR VENT: 30 %
O2/TOTAL GAS SETTING VFR VENT: 40 %
OXYHGB MFR BLDV: 97 % (ref 70–75)
PCO2 BLD: 38 MM HG (ref 35–45)
PCO2 BLDV: 36 MM HG (ref 40–50)
PEEP: 5 CM H2O
PH BLD: 7.42 [PH] (ref 7.35–7.45)
PH BLDV: 7.5 [PH] (ref 7.32–7.43)
PHOSPHATE SERPL-MCNC: 3.8 MG/DL (ref 2.5–4.5)
PLATELET # BLD AUTO: 209 10E3/UL (ref 150–450)
PLATELET # BLD AUTO: 218 10E3/UL (ref 150–450)
PLATELET # BLD AUTO: 293 10E3/UL (ref 150–450)
PO2 BLD: 188 MM HG (ref 80–105)
PO2 BLDV: 104 MM HG (ref 25–47)
POTASSIUM SERPL-SCNC: 3.7 MMOL/L (ref 3.4–5.3)
PROT SERPL-MCNC: 7.1 G/DL (ref 6.4–8.3)
RBC # BLD AUTO: 4.7 10E6/UL (ref 4.4–5.9)
RBC # BLD AUTO: 4.87 10E6/UL (ref 4.4–5.9)
RBC # BLD AUTO: 5.23 10E6/UL (ref 4.4–5.9)
SAO2 % BLDA: 98 % (ref 92–100)
SAO2 % BLDV: 98.7 % (ref 70–75)
SODIUM SERPL-SCNC: 145 MMOL/L (ref 135–145)
TROPONIN T SERPL HS-MCNC: <6 NG/L
WBC # BLD AUTO: 12.5 10E3/UL (ref 4–11)
WBC # BLD AUTO: 8.2 10E3/UL (ref 4–11)
WBC # BLD AUTO: 9.3 10E3/UL (ref 4–11)

## 2024-12-07 PROCEDURE — 200N000001 HC R&B ICU

## 2024-12-07 PROCEDURE — 85018 HEMOGLOBIN: CPT

## 2024-12-07 PROCEDURE — 84100 ASSAY OF PHOSPHORUS: CPT | Performed by: INTERNAL MEDICINE

## 2024-12-07 PROCEDURE — 250N000011 HC RX IP 250 OP 636: Performed by: INTERNAL MEDICINE

## 2024-12-07 PROCEDURE — 250N000013 HC RX MED GY IP 250 OP 250 PS 637: Performed by: INTERNAL MEDICINE

## 2024-12-07 PROCEDURE — 82374 ASSAY BLOOD CARBON DIOXIDE: CPT | Performed by: INTERNAL MEDICINE

## 2024-12-07 PROCEDURE — 99291 CRITICAL CARE FIRST HOUR: CPT | Performed by: INTERNAL MEDICINE

## 2024-12-07 PROCEDURE — 83605 ASSAY OF LACTIC ACID: CPT | Performed by: INTERNAL MEDICINE

## 2024-12-07 PROCEDURE — 85730 THROMBOPLASTIN TIME PARTIAL: CPT

## 2024-12-07 PROCEDURE — 36415 COLL VENOUS BLD VENIPUNCTURE: CPT

## 2024-12-07 PROCEDURE — 82962 GLUCOSE BLOOD TEST: CPT

## 2024-12-07 PROCEDURE — 84484 ASSAY OF TROPONIN QUANT: CPT | Performed by: INTERNAL MEDICINE

## 2024-12-07 PROCEDURE — 93005 ELECTROCARDIOGRAM TRACING: CPT

## 2024-12-07 PROCEDURE — 82805 BLOOD GASES W/O2 SATURATION: CPT | Performed by: INTERNAL MEDICINE

## 2024-12-07 PROCEDURE — 85049 AUTOMATED PLATELET COUNT: CPT | Performed by: INTERNAL MEDICINE

## 2024-12-07 PROCEDURE — 83735 ASSAY OF MAGNESIUM: CPT | Performed by: INTERNAL MEDICINE

## 2024-12-07 PROCEDURE — 36415 COLL VENOUS BLD VENIPUNCTURE: CPT | Performed by: INTERNAL MEDICINE

## 2024-12-07 PROCEDURE — 5A1945Z RESPIRATORY VENTILATION, 24-96 CONSECUTIVE HOURS: ICD-10-PCS | Performed by: INTERNAL MEDICINE

## 2024-12-07 PROCEDURE — 99292 CRITICAL CARE ADDL 30 MIN: CPT | Performed by: INTERNAL MEDICINE

## 2024-12-07 PROCEDURE — 93010 ELECTROCARDIOGRAM REPORT: CPT | Performed by: INTERNAL MEDICINE

## 2024-12-07 PROCEDURE — 999N000065 XR ABDOMEN PORT 1 VIEW

## 2024-12-07 PROCEDURE — 80048 BASIC METABOLIC PNL TOTAL CA: CPT | Performed by: INTERNAL MEDICINE

## 2024-12-07 PROCEDURE — 258N000003 HC RX IP 258 OP 636

## 2024-12-07 PROCEDURE — 85610 PROTHROMBIN TIME: CPT

## 2024-12-07 PROCEDURE — 999N000065 XR CHEST PORT 1 VIEW

## 2024-12-07 PROCEDURE — 83605 ASSAY OF LACTIC ACID: CPT

## 2024-12-07 PROCEDURE — 258N000003 HC RX IP 258 OP 636: Performed by: INTERNAL MEDICINE

## 2024-12-07 PROCEDURE — 999N000157 HC STATISTIC RCP TIME EA 10 MIN

## 2024-12-07 PROCEDURE — 85004 AUTOMATED DIFF WBC COUNT: CPT | Performed by: INTERNAL MEDICINE

## 2024-12-07 PROCEDURE — 250N000009 HC RX 250

## 2024-12-07 PROCEDURE — 94002 VENT MGMT INPAT INIT DAY: CPT

## 2024-12-07 PROCEDURE — 3E033XZ INTRODUCTION OF VASOPRESSOR INTO PERIPHERAL VEIN, PERCUTANEOUS APPROACH: ICD-10-PCS

## 2024-12-07 RX ORDER — BISACODYL 10 MG
10 SUPPOSITORY, RECTAL RECTAL DAILY PRN
Status: DISCONTINUED | OUTPATIENT
Start: 2024-12-07 | End: 2024-12-08 | Stop reason: HOSPADM

## 2024-12-07 RX ORDER — DEXMEDETOMIDINE HYDROCHLORIDE 4 UG/ML
.1-1.2 INJECTION, SOLUTION INTRAVENOUS CONTINUOUS
Status: DISCONTINUED | OUTPATIENT
Start: 2024-12-07 | End: 2024-12-08 | Stop reason: HOSPADM

## 2024-12-07 RX ORDER — HALOPERIDOL 5 MG/ML
5 INJECTION INTRAMUSCULAR EVERY 4 HOURS PRN
Status: DISCONTINUED | OUTPATIENT
Start: 2024-12-07 | End: 2024-12-08 | Stop reason: HOSPADM

## 2024-12-07 RX ORDER — DEXTROSE MONOHYDRATE 25 G/50ML
25-50 INJECTION, SOLUTION INTRAVENOUS
Status: DISCONTINUED | OUTPATIENT
Start: 2024-12-07 | End: 2024-12-08 | Stop reason: HOSPADM

## 2024-12-07 RX ORDER — LORAZEPAM 2 MG/ML
INJECTION INTRAMUSCULAR
Status: COMPLETED
Start: 2024-12-07 | End: 2024-12-07

## 2024-12-07 RX ORDER — LORAZEPAM 2 MG/ML
1 INJECTION INTRAMUSCULAR ONCE
Status: COMPLETED | OUTPATIENT
Start: 2024-12-07 | End: 2024-12-07

## 2024-12-07 RX ORDER — DIAZEPAM 10 MG/2ML
10 INJECTION, SOLUTION INTRAMUSCULAR; INTRAVENOUS EVERY 6 HOURS
Status: DISCONTINUED | OUTPATIENT
Start: 2024-12-07 | End: 2024-12-08

## 2024-12-07 RX ORDER — CLONIDINE HYDROCHLORIDE 0.1 MG/1
0.1 TABLET ORAL EVERY 8 HOURS
Status: DISCONTINUED | OUTPATIENT
Start: 2024-12-07 | End: 2024-12-08 | Stop reason: HOSPADM

## 2024-12-07 RX ORDER — ENOXAPARIN SODIUM 100 MG/ML
40 INJECTION SUBCUTANEOUS EVERY 24 HOURS
Status: DISCONTINUED | OUTPATIENT
Start: 2024-12-07 | End: 2024-12-08 | Stop reason: HOSPADM

## 2024-12-07 RX ORDER — FOLIC ACID 5 MG/ML
1 INJECTION, SOLUTION INTRAMUSCULAR; INTRAVENOUS; SUBCUTANEOUS DAILY
Status: DISCONTINUED | OUTPATIENT
Start: 2024-12-08 | End: 2024-12-08 | Stop reason: HOSPADM

## 2024-12-07 RX ORDER — MULTIPLE VITAMINS W/ MINERALS TAB 9MG-400MCG
1 TAB ORAL DAILY
Status: DISCONTINUED | OUTPATIENT
Start: 2024-12-07 | End: 2024-12-07

## 2024-12-07 RX ORDER — NALOXONE HYDROCHLORIDE 0.4 MG/ML
0.2 INJECTION, SOLUTION INTRAMUSCULAR; INTRAVENOUS; SUBCUTANEOUS
Status: DISCONTINUED | OUTPATIENT
Start: 2024-12-07 | End: 2024-12-08 | Stop reason: HOSPADM

## 2024-12-07 RX ORDER — NALOXONE HYDROCHLORIDE 0.4 MG/ML
0.4 INJECTION, SOLUTION INTRAMUSCULAR; INTRAVENOUS; SUBCUTANEOUS
Status: DISCONTINUED | OUTPATIENT
Start: 2024-12-07 | End: 2024-12-08 | Stop reason: HOSPADM

## 2024-12-07 RX ORDER — CHLORHEXIDINE GLUCONATE ORAL RINSE 1.2 MG/ML
15 SOLUTION DENTAL EVERY 12 HOURS
Status: DISCONTINUED | OUTPATIENT
Start: 2024-12-07 | End: 2024-12-08

## 2024-12-07 RX ORDER — ROPIVACAINE IN 0.9% SOD CHL/PF 0.1 %
.01-.125 PLASTIC BAG, INJECTION (ML) EPIDURAL CONTINUOUS
Status: DISCONTINUED | OUTPATIENT
Start: 2024-12-07 | End: 2024-12-08

## 2024-12-07 RX ORDER — THIAMINE HYDROCHLORIDE 100 MG/ML
200 INJECTION, SOLUTION INTRAMUSCULAR; INTRAVENOUS 3 TIMES DAILY
Status: DISCONTINUED | OUTPATIENT
Start: 2024-12-07 | End: 2024-12-08 | Stop reason: HOSPADM

## 2024-12-07 RX ORDER — MULTIPLE VITAMINS W/ MINERALS TAB 9MG-400MCG
1 TAB ORAL DAILY
Status: DISCONTINUED | OUTPATIENT
Start: 2024-12-07 | End: 2024-12-08 | Stop reason: HOSPADM

## 2024-12-07 RX ORDER — POLYETHYLENE GLYCOL 3350 17 G/17G
17 POWDER, FOR SOLUTION ORAL DAILY PRN
Status: DISCONTINUED | OUTPATIENT
Start: 2024-12-07 | End: 2024-12-08 | Stop reason: HOSPADM

## 2024-12-07 RX ORDER — PROPOFOL 10 MG/ML
5-75 INJECTION, EMULSION INTRAVENOUS CONTINUOUS
Status: DISCONTINUED | OUTPATIENT
Start: 2024-12-07 | End: 2024-12-08

## 2024-12-07 RX ORDER — NICOTINE POLACRILEX 4 MG
15-30 LOZENGE BUCCAL
Status: DISCONTINUED | OUTPATIENT
Start: 2024-12-07 | End: 2024-12-08 | Stop reason: HOSPADM

## 2024-12-07 RX ORDER — METOPROLOL TARTRATE 1 MG/ML
5 INJECTION, SOLUTION INTRAVENOUS EVERY 6 HOURS PRN
Status: DISCONTINUED | OUTPATIENT
Start: 2024-12-07 | End: 2024-12-07

## 2024-12-07 RX ORDER — AMOXICILLIN 250 MG
2 CAPSULE ORAL 2 TIMES DAILY PRN
Status: DISCONTINUED | OUTPATIENT
Start: 2024-12-07 | End: 2024-12-08 | Stop reason: HOSPADM

## 2024-12-07 RX ORDER — LORAZEPAM 2 MG/ML
2 INJECTION INTRAMUSCULAR ONCE
Status: COMPLETED | OUTPATIENT
Start: 2024-12-07 | End: 2024-12-07

## 2024-12-07 RX ORDER — FOLIC ACID 1 MG/1
1 TABLET ORAL DAILY
Status: DISCONTINUED | OUTPATIENT
Start: 2024-12-10 | End: 2024-12-08 | Stop reason: HOSPADM

## 2024-12-07 RX ORDER — FOLIC ACID 5 MG/ML
1 INJECTION, SOLUTION INTRAMUSCULAR; INTRAVENOUS; SUBCUTANEOUS ONCE
Status: COMPLETED | OUTPATIENT
Start: 2024-12-07 | End: 2024-12-07

## 2024-12-07 RX ORDER — FLUMAZENIL 0.1 MG/ML
0.2 INJECTION, SOLUTION INTRAVENOUS
Status: DISCONTINUED | OUTPATIENT
Start: 2024-12-07 | End: 2024-12-08 | Stop reason: HOSPADM

## 2024-12-07 RX ORDER — AMOXICILLIN 250 MG
1 CAPSULE ORAL 2 TIMES DAILY PRN
Status: DISCONTINUED | OUTPATIENT
Start: 2024-12-07 | End: 2024-12-08 | Stop reason: HOSPADM

## 2024-12-07 RX ADMIN — DEXMEDETOMIDINE HYDROCHLORIDE 0.8 MCG/KG/HR: 400 INJECTION INTRAVENOUS at 22:32

## 2024-12-07 RX ADMIN — SODIUM CHLORIDE, POTASSIUM CHLORIDE, SODIUM LACTATE AND CALCIUM CHLORIDE 1000 ML: 600; 310; 30; 20 INJECTION, SOLUTION INTRAVENOUS at 05:58

## 2024-12-07 RX ADMIN — ENOXAPARIN SODIUM 40 MG: 40 INJECTION SUBCUTANEOUS at 10:14

## 2024-12-07 RX ADMIN — LORAZEPAM 1 MG: 2 INJECTION INTRAMUSCULAR; INTRAVENOUS at 09:14

## 2024-12-07 RX ADMIN — Medication 10 MG: at 21:25

## 2024-12-07 RX ADMIN — PANTOPRAZOLE SODIUM 40 MG: 40 INJECTION, POWDER, FOR SOLUTION INTRAVENOUS at 20:33

## 2024-12-07 RX ADMIN — PROPOFOL 20 MCG/KG/MIN: 10 INJECTION, EMULSION INTRAVENOUS at 14:43

## 2024-12-07 RX ADMIN — SODIUM CHLORIDE, POTASSIUM CHLORIDE, SODIUM LACTATE AND CALCIUM CHLORIDE 500 ML: 600; 310; 30; 20 INJECTION, SOLUTION INTRAVENOUS at 10:24

## 2024-12-07 RX ADMIN — CLONIDINE HYDROCHLORIDE 0.1 MG: 0.1 TABLET ORAL at 09:03

## 2024-12-07 RX ADMIN — DEXMEDETOMIDINE HYDROCHLORIDE 0.7 MCG/KG/HR: 400 INJECTION INTRAVENOUS at 15:11

## 2024-12-07 RX ADMIN — Medication 0.3 MG/HR: at 05:06

## 2024-12-07 RX ADMIN — PROPOFOL 75 MCG/KG/MIN: 10 INJECTION, EMULSION INTRAVENOUS at 09:26

## 2024-12-07 RX ADMIN — THIAMINE HYDROCHLORIDE 200 MG: 100 INJECTION, SOLUTION INTRAMUSCULAR; INTRAVENOUS at 22:27

## 2024-12-07 RX ADMIN — SODIUM CHLORIDE, POTASSIUM CHLORIDE, SODIUM LACTATE AND CALCIUM CHLORIDE 1000 ML: 600; 310; 30; 20 INJECTION, SOLUTION INTRAVENOUS at 15:12

## 2024-12-07 RX ADMIN — PROPOFOL 65 MCG/KG/MIN: 10 INJECTION, EMULSION INTRAVENOUS at 05:22

## 2024-12-07 RX ADMIN — Medication 40 MG: at 09:00

## 2024-12-07 RX ADMIN — DIAZEPAM 10 MG: 5 INJECTION INTRAMUSCULAR; INTRAVENOUS at 20:44

## 2024-12-07 RX ADMIN — PROPOFOL 50 MCG/KG/MIN: 10 INJECTION, EMULSION INTRAVENOUS at 08:43

## 2024-12-07 RX ADMIN — PROPOFOL 35 MCG/KG/MIN: 10 INJECTION, EMULSION INTRAVENOUS at 22:31

## 2024-12-07 RX ADMIN — LORAZEPAM 2 MG: 2 INJECTION INTRAMUSCULAR at 18:25

## 2024-12-07 RX ADMIN — LORAZEPAM 1 MG: 2 INJECTION INTRAMUSCULAR at 09:14

## 2024-12-07 RX ADMIN — DEXMEDETOMIDINE HYDROCHLORIDE 0.2 MCG/KG/HR: 400 INJECTION INTRAVENOUS at 09:16

## 2024-12-07 RX ADMIN — THIAMINE HYDROCHLORIDE 200 MG: 100 INJECTION, SOLUTION INTRAMUSCULAR; INTRAVENOUS at 15:13

## 2024-12-07 RX ADMIN — CHLORHEXIDINE GLUCONATE 15 ML: 1.2 SOLUTION ORAL at 10:14

## 2024-12-07 RX ADMIN — CHLORHEXIDINE GLUCONATE 15 ML: 1.2 SOLUTION ORAL at 20:33

## 2024-12-07 RX ADMIN — LORAZEPAM 2 MG: 2 INJECTION INTRAMUSCULAR; INTRAVENOUS at 18:25

## 2024-12-07 RX ADMIN — THIAMINE HYDROCHLORIDE 200 MG: 100 INJECTION, SOLUTION INTRAMUSCULAR; INTRAVENOUS at 08:47

## 2024-12-07 RX ADMIN — Medication 1 TABLET: at 09:03

## 2024-12-07 RX ADMIN — FOLIC ACID 1 MG: 5 INJECTION, SOLUTION INTRAMUSCULAR; INTRAVENOUS; SUBCUTANEOUS at 08:50

## 2024-12-07 RX ADMIN — NOREPINEPHRINE BITARTRATE 0.03 MCG/KG/MIN: 0.02 INJECTION, SOLUTION INTRAVENOUS at 10:24

## 2024-12-07 ASSESSMENT — ACTIVITIES OF DAILY LIVING (ADL)
HEARING_DIFFICULTY_OR_DEAF: OTHER (SEE COMMENTS)
WEAR_GLASSES_OR_BLIND: OTHER (SEE COMMENTS)
ADLS_ACUITY_SCORE: 80
ADLS_ACUITY_SCORE: 41
WALKING_OR_CLIMBING_STAIRS_DIFFICULTY: OTHER (SEE COMMENTS)
ADLS_ACUITY_SCORE: 74
DIFFICULTY_COMMUNICATING: OTHER (SEE COMMENTS)
DIFFICULTY_EATING/SWALLOWING: OTHER (SEE COMMENTS)
ADLS_ACUITY_SCORE: 80
ADLS_ACUITY_SCORE: 69
ADLS_ACUITY_SCORE: 41
ADLS_ACUITY_SCORE: 41
DOING_ERRANDS_INDEPENDENTLY_DIFFICULTY: OTHER (SEE COMMENTS)
CHANGE_IN_FUNCTIONAL_STATUS_SINCE_ONSET_OF_CURRENT_ILLNESS/INJURY: YES
DRESSING/BATHING_DIFFICULTY: OTHER (SEE COMMENTS)
ADLS_ACUITY_SCORE: 76
ADLS_ACUITY_SCORE: 80
ADLS_ACUITY_SCORE: 67
CONCENTRATING,_REMEMBERING_OR_MAKING_DECISIONS_DIFFICULTY: OTHER (SEE COMMENTS)
ADLS_ACUITY_SCORE: 76
ADLS_ACUITY_SCORE: 69
TOILETING_ISSUES: OTHER (SEE COMMENTS)
EQUIPMENT_CURRENTLY_USED_AT_HOME: OTHER (SEE COMMENTS)
ADLS_ACUITY_SCORE: 76
ADLS_ACUITY_SCORE: 69

## 2024-12-07 NOTE — PROGRESS NOTES
[facility]       MICU History and Physical    Date of Service: 12/07/24      Goran Redding IS a 24 year old male admitted on 12/7/2024 for alcohol use disorder.     I have personally reviewed the daily labs, imaging studies, cultures and discussed the case with referring physician and consulting physicians.     My assessment and plan by system for this patient is as follows:    Neurology/Psychiatry:   Toxic metabolic encephalopathy due to likely alcohol withdrawal    Plan  -Sedation with propofol and Dilaudid drip and as needed.  - decrease dilaudid infusion rate  - add precedex  -RASS:  -CIWA protocol for alcohol withdrawal  -Vitamin B1 and folate replacement.    Cardiovascular:   No acute issues, balance, not on vasopressor support  EKG showed sinus tachycardia, right axis deviation.  Troponin is pending.    Plan  -Monitor on telemetry.    Pulmonary/Ventilator Management:   Acute respiratory failure due to decreased generalized airway protective reflexes.  Chest x-ray was done, no evidence for acute infiltrate.    Plan  -Mechanical ventilatory support.FiO2 (%): 30 %, Resp: 16, Vent Mode: CMV/AC, Resp Rate (Set): 16 breaths/min, Tidal Volume (Set, mL): 550 mL, PEEP (cm H2O): 5 cmH2O, Resp Rate (Set): 16 breaths/min, Tidal Volume (Set, mL): 550 mL, PEEP (cm H2O): 5 cmH2O    GI and Nutrition :   No acute issues    Plan  -N.p.o. for now    Renal/Fluids/Electrolytes:   1.  Low urine output    Plan  -Order renal panel    - monitor function and electrolytes as needed with replacement per ICU protocols.  - generally avoid nephrotoxic agents such as NSAID, IV contrast unless specifically required  - adjust medications as needed for renal clearance  - follow I/O's as appropriate.    Infectious Disease:   No evidence for acute infection    Plan  -Monitor while off antibiotics    Endocrine:   No acute issues  Plan  - ICU insulin protocol, goal sugar <180  -Hypoglycemia protocol    Hematology/Oncology:   1.  Mild  "leukocytosis    Plan  -Monitor    IV/Access:   1. Venous access -peripheral IV  2. Arterial access -none    Plan  -No central access required and necessary      ICU Prophylaxis:   1. DVT: Lovenox  2. Stress Ulcer: PPI/H2 blocker  3. Restraints: Nonviolent soft two point restraints required and necessary for patient safety and continued cares and good effect as patient continues to pull at necessary lines, tubes despite education and distraction. Will readdress daily.   4. Feeding -NPO  5. Family Update: No family at the bedside  6. Disposition -ICU      Critical Care Time: 45 min.  I spent this time (excluding procedures) personally providing and directing critical care services at the bedside and on the critical care unit.     Sivakumar Flores MD   Pulmonary and Critical Care        HPI:  While on 55 mcg/kg/min of propofol, he remains agitated. Not following commands.       /80   Pulse 116   Temp 98  F (36.7  C) (Axillary)   Resp 16   Ht 1.93 m (6' 4\")   Wt 67.3 kg (148 lb 5.9 oz)   SpO2 100%   BMI 18.06 kg/m      FiO2 (%): 30 %, Resp: 16, Vent Mode: CMV/AC, Resp Rate (Set): 16 breaths/min, Tidal Volume (Set, mL): 550 mL, PEEP (cm H2O): 5 cmH2O, Resp Rate (Set): 16 breaths/min, Tidal Volume (Set, mL): 550 mL, PEEP (cm H2O): 5 cmH2O      Intake/Output Summary (Last 24 hours) at 12/7/2024 0440  Last data filed at 12/7/2024 0400  Gross per 24 hour   Intake --   Output 125 ml   Net -125 ml       Physical Exam:  Gen: Critically, agitated.  Neuro: Agitated, moving 4 extremities in bed.  HEENT: ET tube noted.  Pulm: Clear to auscultation bilaterally, no rales, no rhonchi or wheezing.  CV: Regular rate and rhythm, no murmurs.  Abd: Soft, nontender.  MSK: No deformities.  Skin: No rash.    Labs: reviewed    Imaging: reviewed         "

## 2024-12-07 NOTE — PLAN OF CARE
Goal Outcome Evaluation:      Plan of Care Reviewed With: patient    Overall Patient Progress: no changeOverall Patient Progress: no change    Outcome Evaluation: Pt arrived to ICU @ 0400, intubated and sedated. Periods of restlessness with activity, sedation adjusted. Tele SR/ST. LS clear, FiO2 30% PEEP 5. OG to LIS, confirmed by XR. Morin in place. LR bolus given for elevated lactic, recheck later this AM.      Problem: Restraint, Nonviolent  Goal: Absence of Harm or Injury  Outcome: Progressing  Intervention: Implement Least Restrictive Safety Strategies  Recent Flowsheet Documentation  Taken 12/7/2024 0400 by Adeline Celaya RN  Medical Device Protection:   torso covered   tubing secured  Intervention: Protect Dignity, Rights and Personal Wellbeing  Recent Flowsheet Documentation  Taken 12/7/2024 0400 by Adeline Celaya RN  Trust Relationship/Rapport:   care explained   reassurance provided  Intervention: Protect Skin and Joint Integrity  Recent Flowsheet Documentation  Taken 12/7/2024 0600 by Adeline Celaya RN  Body Position:   turned   legs elevated   upper extremity elevated  Taken 12/7/2024 0400 by Adeline Celaya RN  Body Position:   turned   legs elevated   upper extremity elevated  Range of Motion: ROM (range of motion) performed     Problem: Mechanical Ventilation Invasive  Goal: Optimal Device Function  Outcome: Progressing  Intervention: Optimize Device Care and Function  Recent Flowsheet Documentation  Taken 12/7/2024 0600 by Adeline Celaya RN  Oral Care:   swabbed with antiseptic solution   suction provided  Taken 12/7/2024 0400 by Adeline Celaya RN  Oral Care:   swabbed with antiseptic solution   suction provided

## 2024-12-07 NOTE — CONSULTS
[facility]  Gastroenterology Consultation         Goran Redding  No address on file.  24 year old male    Admission Date/Time: 12/7/2024  Primary Care Provider: No primary care provider on file.  Referring / Attending Physician:  Dr. Flores    We were asked to see the patient in consultation by Dr. Flores for evaluation of alcohol withdrawal hematemesis.      CC: Hematemesis    HPI:  Goran Redding is a 24 year old male who was admitted to ICU patient is currently intubated patient has history of significant alcohol use patient was found on the street with significant altered mental status poorly patient has been drinking heavily patient was noticed to have episode of hematemesis.  Patient has been very agitated belligerent patient was at [facility] where patient left AMA now patient was admitted with significant respiratory compromise alcohol withdrawal seizure possible GI bleed patient is currently intubated sedated patient has OG tube which shows normal clear secretions to bilious secretions no signs of active bleeding patient's hemoglobin is in the range of 14 on arrival.  Patient is currently on pressors and sedation.  Most of the information was obtained by reviewing chart and discussing with the intensive care team.    ROS: A comprehensive ten point review of systems was negative aside from those in mentioned in the HPI.      PAST MED HX:  I have reviewed this patient's medical history and updated it with pertinent information if needed.   No past medical history on file.    MEDICATIONS:   None       ALLERGIES: Not on File    SOCIAL HISTORY:       FAMILY HISTORY:  No family history on file.    PHYSICAL EXAM:   General intubated sedated  Vital Signs with Ranges  Temp: 98.2  F (36.8  C) Temp src: Axillary BP: 122/72 Pulse: 90   Resp: 16 SpO2: 100 % O2 Device: Mechanical Ventilator    I/O last 3 completed shifts:  In: 1023.07 [I.V.:23.07; IV Piggyback:1000]  Out: 405  [Urine:155; Emesis/NG output:250]    Cardiovascular: negative, PMI normal. No lifts, heaves, or thrills. RRR. No murmurs, clicks gallops or rub  Respiratory: negative, Percussion normal. Good diaphragmatic excursion. Lungs clear  Head: Normocephalic. No masses, lesions, tenderness or abnormalities  Abdomen: Abdomen soft, non-tender. BS normal. No masses, organomegaly  SKIN: no suspicious lesions or rashes          ADDITIONAL COMMENTS:   I reviewed the patient's new clinical lab test results.   Recent Labs   Lab Test 12/07/24 0458   WBC 12.5*   HGB 14.2   MCV 83        Recent Labs   Lab Test 12/07/24 0458   POTASSIUM 3.7   CHLORIDE 104   CO2 21*   BUN 13.5   ANIONGAP 20*     Recent Labs   Lab Test 12/07/24 0458   ALBUMIN 4.5   BILITOTAL 0.3   ALT 26   AST 37       I reviewed the patient's new imaging results.        CONSULTATION ASSESSMENT AND PLAN:    Active Problems:    Alcohol abuse    Assessment: 24-year-old gentleman with history of heavy alcohol use with significant complications due to alcohol abuse and alcohol withdrawal leading to nausea vomiting hematemesis seizures electrolyte abnormalities respiratory failure patient is currently intubated patient has OG tube no active bleeding noticed from the OG tube patient's hemoglobin is stable patient is currently on IV Protonix.  I will recommend to continue on current treatment plan continue supportive care continue to monitor respiratory and neurological checks.  If patient declare any further signs of bleeding or significant drop in hemoglobin patient would benefit from endoscopic evaluation patient findings are most likely suggestive of possible esophagitis versus Debi-Maria tear.  Finding and plan discussed with the intensive care team.  Thank you very much for letting me participate in his care.                Blayne Collier MD, FACP  [facility].  Office: [phone]  Cell : [phone]      [facility]  Ph: [phone] Fax: [phone]

## 2024-12-07 NOTE — H&P
[facility]       MICU History and Physical    Date of Service: 12/07/24      Goran Redding IS a 24 year old male admitted on 12/7/2024 for alcohol withdrawal.     I have personally reviewed the daily labs, imaging studies, cultures and discussed the case with referring physician and consulting physicians.     My assessment and plan by system for this patient is as follows:    Neurology/Psychiatry:   Toxic metabolic encephalopathy due to alcohol withdrawal    Plan  -Sedation with propofol and Dilaudid drip and as needed.  -RASS:  -[facility] protocol for alcohol withdrawal  -Vitamin B1 and folate replacement.    Cardiovascular:   No acute issues, balance, not on vasopressor support  EKG showed sinus tachycardia, right axis deviation.  Troponin is pending.    Plan  -Monitor on telemetry.    Pulmonary/Ventilator Management:   Acute respiratory failure due to decreased generalized airway protective reflexes.  Chest x-ray was done, no evidence for acute infiltrate.    Plan  -Mechanical ventilatory support.FiO2 (%): 40 %, Resp: 16, Vent Mode: CMV/AC, Resp Rate (Set): 16 breaths/min, Tidal Volume (Set, mL): 550 mL, PEEP (cm H2O): 5 cmH2O, Resp Rate (Set): 16 breaths/min, Tidal Volume (Set, mL): 550 mL, PEEP (cm H2O): 5 cmH2O  -VBG, adjust ventilator accordingly.    GI and Nutrition :   No acute issues    Plan  -N.p.o. for now, if unable to extubate in the next 24 hours we will start him on tube feed  -Check liver enzymes.    Renal/Fluids/Electrolytes:   1.  Low urine output    Plan  -Order renal panel  -IV fluid bolus with LR 1000 cc over 1 hour  - monitor function and electrolytes as needed with replacement per ICU protocols.  - generally avoid nephrotoxic agents such as NSAID, IV contrast unless specifically required  - adjust medications as needed for renal clearance  - follow I/O's as appropriate.    Infectious Disease:   No evidence for acute infection    Plan  -Monitor while off antibiotics    Endocrine:   No  acute issues  Plan  - ICU insulin protocol, goal sugar <180  -Hypoglycemia protocol    Hematology/Oncology:   1.  Mild leukocytosis    Plan  -Monitor          IV/Access:   1. Venous access -peripheral IV  2. Arterial access -none  3.  Plan  -No central access required and necessary      ICU Prophylaxis:   1. DVT: Lovenox  2. Stress Ulcer: PPI/H2 blocker  3. Restraints: Nonviolent soft two point restraints required and necessary for patient safety and continued cares and good effect as patient continues to pull at necessary lines, tubes despite education and distraction. Will readdress daily.   4. Feeding -NPO  5. Family Update: No family at the bedside  6. Disposition -ICU    Chart documentation was completed, in part, with [employer] voice-recognition software. Even though reviewed, some grammatical, spelling, and word errors may remain.    Critical Care Time: 70 min.  I spent this time (excluding procedures) personally providing and directing critical care services at the bedside and on the critical care unit.     Mercy Covarrubias MD   Pulmonary and Critical Care        HPI:  24-year-old male patient with past medical history significant for alcohol use disorder, alcohol withdrawal seizure, and GI bleed.  The patient was transferred from outside facility due to concern for toxic encephalopathy and alcohol withdrawal.  The patient was recently admitted to [facility], he was admitted for alcohol withdrawal seizure, he left AMA.  And then he went home.  Reportedly he was found on the street with significant altered mental status.  Reportedly he was drinking and then he stopped.  EMS was called, and they gave him droperidol on the scene, he became very belligerent and agitated, eventually was taken to the emergency department, and due to ongoing altered mental status the patient was intubated.  Reportedly he had normal blood work including CBC and electrolytes, but his alcohol level was elevated.    Unable to obtain  ROS due to intubation.    BP (!) 139/101   Pulse 115   Temp 98  F (36.7  C) (Axillary)   Wt 67.3 kg (148 lb 5.9 oz)     FiO2 (%): 40 %, Vent Mode: CMV/AC, Resp Rate (Set): 16 breaths/min, Tidal Volume (Set, mL): 550 mL, PEEP (cm H2O): 5 cmH2O, Resp Rate (Set): 16 breaths/min, Tidal Volume (Set, mL): 550 mL, PEEP (cm H2O): 5 cmH2O      Intake/Output Summary (Last 24 hours) at 12/7/2024 0440  Last data filed at 12/7/2024 0400  Gross per 24 hour   Intake --   Output 125 ml   Net -125 ml       Physical Exam:  Gen: Critically, agitated.  Neuro: Agitated, moving 4 extremities in bed.  HEENT: ET tube noted.  Pulm: Clear to auscultation bilaterally, no rales, no rhonchi or wheezing.  CV: Regular rate and rhythm, no murmurs.  Abd: Soft, nontender.  MSK: No deformities.  Skin: No rash.    Labs: reviewed    Imaging: reviewed    No family history on file.    Social History     Socioeconomic History    Marital status: Not on file     Spouse name: Not on file    Number of children: Not on file    Years of education: Not on file    Highest education level: Not on file   Occupational History    Not on file   Tobacco Use    Smoking status: Not on file    Smokeless tobacco: Not on file   Substance and Sexual Activity    Alcohol use: Not on file    Drug use: Not on file    Sexual activity: Not on file   Other Topics Concern    Not on file   Social History Narrative    Not on file     Social Drivers of Health     Financial Resource Strain: Not on file   Food Insecurity: Not on file   Transportation Needs: Not on file   Physical Activity: Not on file   Stress: Not on file   Social Connections: Not on file   Interpersonal Safety: Not on file   Housing Stability: Not on file

## 2024-12-08 VITALS
OXYGEN SATURATION: 98 % | WEIGHT: 154.32 LBS | TEMPERATURE: 99.4 F | DIASTOLIC BLOOD PRESSURE: 77 MMHG | BODY MASS INDEX: 18.79 KG/M2 | SYSTOLIC BLOOD PRESSURE: 134 MMHG | HEART RATE: 90 BPM | HEIGHT: 76 IN | RESPIRATION RATE: 12 BRPM

## 2024-12-08 LAB
ALBUMIN SERPL BCG-MCNC: 3.6 G/DL (ref 3.5–5.2)
ALP SERPL-CCNC: 64 U/L (ref 40–150)
ALT SERPL W P-5'-P-CCNC: 17 U/L (ref 0–70)
ANION GAP SERPL CALCULATED.3IONS-SCNC: 12 MMOL/L (ref 7–15)
AST SERPL W P-5'-P-CCNC: 21 U/L (ref 0–45)
ATRIAL RATE - MUSE: 106 BPM
BASE EXCESS BLDV CALC-SCNC: 3.6 MMOL/L (ref -3–3)
BILIRUB SERPL-MCNC: 0.7 MG/DL
BUN SERPL-MCNC: 14.5 MG/DL (ref 6–20)
CALCIUM SERPL-MCNC: 8.7 MG/DL (ref 8.8–10.4)
CHLORIDE SERPL-SCNC: 102 MMOL/L (ref 98–107)
CREAT SERPL-MCNC: 0.95 MG/DL (ref 0.67–1.17)
DIASTOLIC BLOOD PRESSURE - MUSE: NORMAL MMHG
EGFRCR SERPLBLD CKD-EPI 2021: >90 ML/MIN/1.73M2
ERYTHROCYTE [DISTWIDTH] IN BLOOD BY AUTOMATED COUNT: 13.9 % (ref 10–15)
ERYTHROCYTE [DISTWIDTH] IN BLOOD BY AUTOMATED COUNT: 14.1 % (ref 10–15)
GLUCOSE BLDC GLUCOMTR-MCNC: 108 MG/DL (ref 70–99)
GLUCOSE SERPL-MCNC: 102 MG/DL (ref 70–99)
HCO3 BLDV-SCNC: 29 MMOL/L (ref 21–28)
HCO3 SERPL-SCNC: 25 MMOL/L (ref 22–29)
HCT VFR BLD AUTO: 36.2 % (ref 40–53)
HCT VFR BLD AUTO: 38.2 % (ref 40–53)
HGB BLD-MCNC: 12 G/DL (ref 13.3–17.7)
HGB BLD-MCNC: 12.2 G/DL (ref 13.3–17.7)
INTERPRETATION ECG - MUSE: NORMAL
LACTATE SERPL-SCNC: 0.6 MMOL/L (ref 0.7–2)
LACTATE SERPL-SCNC: 1.1 MMOL/L (ref 0.7–2)
MAGNESIUM SERPL-MCNC: 1.6 MG/DL (ref 1.7–2.3)
MCH RBC QN AUTO: 26.2 PG (ref 26.5–33)
MCH RBC QN AUTO: 26.8 PG (ref 26.5–33)
MCHC RBC AUTO-ENTMCNC: 31.9 G/DL (ref 31.5–36.5)
MCHC RBC AUTO-ENTMCNC: 33.1 G/DL (ref 31.5–36.5)
MCV RBC AUTO: 81 FL (ref 78–100)
MCV RBC AUTO: 82 FL (ref 78–100)
O2/TOTAL GAS SETTING VFR VENT: 30 %
OXYHGB MFR BLDV: 97 % (ref 70–75)
P AXIS - MUSE: 83 DEGREES
PCO2 BLDV: 45 MM HG (ref 40–50)
PH BLDV: 7.41 [PH] (ref 7.32–7.43)
PHOSPHATE SERPL-MCNC: 3.5 MG/DL (ref 2.5–4.5)
PLATELET # BLD AUTO: 182 10E3/UL (ref 150–450)
PLATELET # BLD AUTO: 188 10E3/UL (ref 150–450)
PO2 BLDV: 108 MM HG (ref 25–47)
POTASSIUM SERPL-SCNC: 3.8 MMOL/L (ref 3.4–5.3)
PR INTERVAL - MUSE: 118 MS
PROT SERPL-MCNC: 6 G/DL (ref 6.4–8.3)
QRS DURATION - MUSE: 92 MS
QT - MUSE: 342 MS
QTC - MUSE: 454 MS
R AXIS - MUSE: 102 DEGREES
RBC # BLD AUTO: 4.48 10E6/UL (ref 4.4–5.9)
RBC # BLD AUTO: 4.66 10E6/UL (ref 4.4–5.9)
SAO2 % BLDV: 98.6 % (ref 70–75)
SODIUM SERPL-SCNC: 139 MMOL/L (ref 135–145)
SYSTOLIC BLOOD PRESSURE - MUSE: NORMAL MMHG
T AXIS - MUSE: 83 DEGREES
VENTRICULAR RATE- MUSE: 106 BPM
WBC # BLD AUTO: 10 10E3/UL (ref 4–11)
WBC # BLD AUTO: 10.9 10E3/UL (ref 4–11)

## 2024-12-08 PROCEDURE — 999N000253 HC STATISTIC WEANING TRIALS

## 2024-12-08 PROCEDURE — 80053 COMPREHEN METABOLIC PANEL: CPT

## 2024-12-08 PROCEDURE — 999N000157 HC STATISTIC RCP TIME EA 10 MIN

## 2024-12-08 PROCEDURE — 258N000003 HC RX IP 258 OP 636: Performed by: INTERNAL MEDICINE

## 2024-12-08 PROCEDURE — 83605 ASSAY OF LACTIC ACID: CPT | Performed by: INTERNAL MEDICINE

## 2024-12-08 PROCEDURE — 250N000013 HC RX MED GY IP 250 OP 250 PS 637: Performed by: INTERNAL MEDICINE

## 2024-12-08 PROCEDURE — 85049 AUTOMATED PLATELET COUNT: CPT | Performed by: INTERNAL MEDICINE

## 2024-12-08 PROCEDURE — 83735 ASSAY OF MAGNESIUM: CPT | Performed by: INTERNAL MEDICINE

## 2024-12-08 PROCEDURE — 99291 CRITICAL CARE FIRST HOUR: CPT

## 2024-12-08 PROCEDURE — 250N000011 HC RX IP 250 OP 636: Performed by: INTERNAL MEDICINE

## 2024-12-08 PROCEDURE — 36415 COLL VENOUS BLD VENIPUNCTURE: CPT | Performed by: INTERNAL MEDICINE

## 2024-12-08 PROCEDURE — 85018 HEMOGLOBIN: CPT | Performed by: INTERNAL MEDICINE

## 2024-12-08 PROCEDURE — 250N000013 HC RX MED GY IP 250 OP 250 PS 637

## 2024-12-08 PROCEDURE — 94003 VENT MGMT INPAT SUBQ DAY: CPT

## 2024-12-08 PROCEDURE — 84100 ASSAY OF PHOSPHORUS: CPT | Performed by: INTERNAL MEDICINE

## 2024-12-08 PROCEDURE — 250N000009 HC RX 250

## 2024-12-08 RX ORDER — DIAZEPAM 10 MG/2ML
5-10 INJECTION, SOLUTION INTRAMUSCULAR; INTRAVENOUS EVERY 30 MIN PRN
Status: DISCONTINUED | OUTPATIENT
Start: 2024-12-08 | End: 2024-12-08 | Stop reason: HOSPADM

## 2024-12-08 RX ORDER — SODIUM CHLORIDE, SODIUM LACTATE, POTASSIUM CHLORIDE, CALCIUM CHLORIDE 600; 310; 30; 20 MG/100ML; MG/100ML; MG/100ML; MG/100ML
INJECTION, SOLUTION INTRAVENOUS CONTINUOUS
Status: DISCONTINUED | OUTPATIENT
Start: 2024-12-08 | End: 2024-12-08

## 2024-12-08 RX ORDER — DIAZEPAM 10 MG/2ML
5 INJECTION, SOLUTION INTRAMUSCULAR; INTRAVENOUS EVERY 6 HOURS
Status: DISCONTINUED | OUTPATIENT
Start: 2024-12-08 | End: 2024-12-08

## 2024-12-08 RX ORDER — DIAZEPAM 5 MG/1
10 TABLET ORAL EVERY 30 MIN PRN
Status: DISCONTINUED | OUTPATIENT
Start: 2024-12-08 | End: 2024-12-08 | Stop reason: HOSPADM

## 2024-12-08 RX ADMIN — DIAZEPAM 10 MG: 5 INJECTION INTRAMUSCULAR; INTRAVENOUS at 08:33

## 2024-12-08 RX ADMIN — PANTOPRAZOLE SODIUM 40 MG: 40 INJECTION, POWDER, FOR SOLUTION INTRAVENOUS at 08:33

## 2024-12-08 RX ADMIN — DIAZEPAM 10 MG: 5 INJECTION INTRAMUSCULAR; INTRAVENOUS at 02:43

## 2024-12-08 RX ADMIN — SODIUM CHLORIDE, POTASSIUM CHLORIDE, SODIUM LACTATE AND CALCIUM CHLORIDE: 600; 310; 30; 20 INJECTION, SOLUTION INTRAVENOUS at 04:00

## 2024-12-08 RX ADMIN — SODIUM CHLORIDE, POTASSIUM CHLORIDE, SODIUM LACTATE AND CALCIUM CHLORIDE 1000 ML: 600; 310; 30; 20 INJECTION, SOLUTION INTRAVENOUS at 00:25

## 2024-12-08 RX ADMIN — THIAMINE HYDROCHLORIDE 200 MG: 100 INJECTION, SOLUTION INTRAMUSCULAR; INTRAVENOUS at 08:32

## 2024-12-08 RX ADMIN — Medication 1 TABLET: at 08:32

## 2024-12-08 RX ADMIN — HALOPERIDOL LACTATE 5 MG: 5 INJECTION, SOLUTION INTRAMUSCULAR at 05:35

## 2024-12-08 RX ADMIN — DEXMEDETOMIDINE HYDROCHLORIDE 0.8 MCG/KG/HR: 400 INJECTION INTRAVENOUS at 06:07

## 2024-12-08 RX ADMIN — PROPOFOL 45 MCG/KG/MIN: 10 INJECTION, EMULSION INTRAVENOUS at 06:09

## 2024-12-08 RX ADMIN — CHLORHEXIDINE GLUCONATE 15 ML: 1.2 SOLUTION ORAL at 08:32

## 2024-12-08 RX ADMIN — SODIUM CHLORIDE, POTASSIUM CHLORIDE, SODIUM LACTATE AND CALCIUM CHLORIDE: 600; 310; 30; 20 INJECTION, SOLUTION INTRAVENOUS at 00:25

## 2024-12-08 RX ADMIN — DIAZEPAM 10 MG: 5 TABLET ORAL at 11:47

## 2024-12-08 ASSESSMENT — ACTIVITIES OF DAILY LIVING (ADL)
ADLS_ACUITY_SCORE: 76
ADLS_ACUITY_SCORE: 80
ADLS_ACUITY_SCORE: 76
ADLS_ACUITY_SCORE: 80
ADLS_ACUITY_SCORE: 76
ADLS_ACUITY_SCORE: 80

## 2024-12-08 NOTE — PROGRESS NOTES
"1009: Patient completed brief SBT 5/5 25%, followed commands, and cuff checked for leak before patient was extubated to NC 3L. Small amounts of thin creamy secretions suctioned inline ETT before extubation and orally post-extubation. Patient able to demonstrate a strong productive cough. Lung sounds are clear, no stridor present.     Vital signs post-extubation:  BP (!) 141/91   Pulse 60   Temp 99.4  F (37.4  C) (Axillary)   Resp 12   Ht 1.93 m (6' 4\")   Wt 70 kg (154 lb 5.2 oz)   SpO2 99%   BMI 18.78 kg/m        "

## 2024-12-08 NOTE — PROGRESS NOTES
ICU Note:    Patient discharged.  He is quite upset that he is up in [place], and allo his belongings (car, wallet, keys, phone) are all in [place]. He is still having some mild tremors.  He is feeling stressed out.  He wants to leave the hospital immediately.  I think he is still going through some alcohol withdrawal, and it may be helpful for him to stay longer to get his treated in the hospital.  He does not want to stay, and so has signed paperwork leaving AMA.      Sivakumar Flores MD

## 2024-12-08 NOTE — PROGRESS NOTES
Patient extubated at 1006. Following extubation patient requiring redirection as to why he is in the ICU/how he ended up at [facility]. Writer explained situation. Patient requesting to leave. Explained he would be leaving AM and patient stated that he can't be held here. Writer agreed and provided information as to why he should stay. Patient still requesting to leave AM. Writer confirmed that patient's belongings are at [facility] in [place] with the nurse line. Taxi ordered for patient and patient left to go back to [place] to retrieve belongings. Education provided.

## 2024-12-08 NOTE — PLAN OF CARE
Goal Outcome Evaluation:      Plan of Care Reviewed With: patient    Overall Patient Progress: no changeOverall Patient Progress: no change    Outcome Evaluation: Pt remains intubated, intermittently restess/agitated on sedation. Able to follow commands and communicate through writing. Titrated sedation as needed for acute episodes of agitation. Tele SR. MAP > 65, BP stable on sedation. LS clear. OG to LIS, output brown/yellow. No signs of GIB, Hgb stable. Morin in place, 1 L LR bolus and continous fluids initiated for low UOP, improved. Plan to attempt to extubate today and continue following CIWA as able.      Problem: Restraint, Nonviolent  Goal: Absence of Harm or Injury  Outcome: Progressing  Intervention: Implement Least Restrictive Safety Strategies  Recent Flowsheet Documentation  Taken 12/8/2024 0400 by Adeline Celaya RN  Medical Device Protection:   tubing secured   torso covered  Taken 12/8/2024 0000 by Adeline Celaya RN  Medical Device Protection:   tubing secured   torso covered  Taken 12/7/2024 2000 by Adeline Celaya RN  Medical Device Protection:   tubing secured   torso covered  Intervention: Protect Dignity, Rights and Personal Wellbeing  Recent Flowsheet Documentation  Taken 12/8/2024 0400 by Adeline Celaya RN  Trust Relationship/Rapport:   care explained   emotional support provided   reassurance provided   thoughts/feelings acknowledged  Taken 12/8/2024 0000 by Adeline Celaya RN  Trust Relationship/Rapport:   care explained   emotional support provided   reassurance provided   thoughts/feelings acknowledged  Taken 12/7/2024 2000 by Adeline Celaya RN  Trust Relationship/Rapport:   care explained   emotional support provided   reassurance provided   thoughts/feelings acknowledged  Intervention: Protect Skin and Joint Integrity  Recent Flowsheet Documentation  Taken 12/8/2024 0600 by Adeline Celaya, RN  Body Position:   turned   upper extremity elevated  Taken 12/8/2024  0400 by Adeline Celaya RN  Body Position:   turned   upper extremity elevated  Range of Motion: ROM (range of motion) performed  Taken 12/8/2024 0200 by Adeline Celaya RN  Body Position:   turned   upper extremity elevated  Taken 12/8/2024 0000 by Adeline Celaya RN  Body Position: weight shifting  Range of Motion: ROM (range of motion) performed  Taken 12/7/2024 2200 by Adeline Celaya RN  Body Position: weight shifting  Taken 12/7/2024 2000 by Adeline Celaya RN  Body Position:   turned   heels elevated   legs elevated   upper extremity elevated   weight shifting  Range of Motion: ROM (range of motion) performed     Problem: Alcohol Withdrawal  Goal: Alcohol Withdrawal Symptom Control  Outcome: Progressing  Intervention: Minimize or Manage Alcohol Withdrawal Symptoms  Recent Flowsheet Documentation  Taken 12/8/2024 0400 by Adeline Celaya RN  Sensory Stimulation Regulation:   auditory stimulation minimized   lighting decreased   care clustered  Taken 12/8/2024 0000 by Adeline Celaya RN  Sensory Stimulation Regulation:   auditory stimulation minimized   lighting decreased   care clustered  Taken 12/7/2024 2000 by Adeline Celaya RN  Sensory Stimulation Regulation:   auditory stimulation minimized   lighting decreased   care clustered     Problem: Mechanical Ventilation Invasive  Goal: Effective Communication  Outcome: Progressing  Intervention: Ensure Effective Communication  Recent Flowsheet Documentation  Taken 12/8/2024 0400 by Adeline Celaya RN  Trust Relationship/Rapport:   care explained   emotional support provided   reassurance provided   thoughts/feelings acknowledged  Taken 12/8/2024 0000 by Adeline Celaya RN  Trust Relationship/Rapport:   care explained   emotional support provided   reassurance provided   thoughts/feelings acknowledged  Taken 12/7/2024 2000 by Adeline Celaya RN  Trust Relationship/Rapport:   care explained   emotional support provided   reassurance  provided   thoughts/feelings acknowledged     Problem: Mechanical Ventilation Invasive  Goal: Optimal Device Function  Outcome: Progressing  Intervention: Optimize Device Care and Function  Recent Flowsheet Documentation  Taken 12/8/2024 0600 by Adeline Celaya RN  Oral Care:   suction provided   swabbed with antiseptic solution  Taken 12/8/2024 0400 by Adeline Celaya, RN  Airway/Ventilation Management: airway patency maintained  Oral Care:   suction provided   swabbed with antiseptic solution  Taken 12/8/2024 0200 by Adeline Celaya RN  Oral Care:   suction provided   swabbed with antiseptic solution  Taken 12/8/2024 0000 by Adeline Celaya RN  Airway/Ventilation Management: airway patency maintained  Oral Care:   swabbed with antiseptic solution   suction provided  Taken 12/7/2024 2200 by Adeline Celaya RN  Oral Care: swabbed with antiseptic solution  Taken 12/7/2024 2000 by Adeline Celaya, RN  Airway/Ventilation Management: airway patency maintained  Oral Care:   suction provided   swabbed with antiseptic solution     Problem: Mechanical Ventilation Invasive  Goal: Absence of Device-Related Skin and Tissue Injury  Outcome: Progressing

## 2024-12-08 NOTE — PROGRESS NOTES
[facility]       MICU History and Physical    Date of Service: 12/07/24      Goran Redding IS a 24 year old male admitted on 12/7/2024 for alcohol use disorder.     I have personally reviewed the daily labs, imaging studies, cultures and discussed the case with referring physician and consulting physicians.     My assessment and plan by system for this patient is as follows:    Neurology/Psychiatry:   Toxic metabolic encephalopathy due to likely alcohol withdrawal    Plan  - stop propofol, continue precedex  - stop dilaudid  -CIWA protocol for alcohol withdrawal  -Vitamin B1 and folate replacement.    Cardiovascular:   Shock -resolved; likely from sedatives    Plan  -Was on levophed, now off  - MAP goal 65    Pulmonary/Ventilator Management:   Acute respiratory failure due to decreased generalized airway protective reflexes.      Plan  - SBT today, did well and so extubated this morning    GI and Nutrition :   Upper GI bleed    Plan  - GI consulted.  IV PPI BID, no plans for endoscopy    Renal/Fluids/Electrolytes:   1.  No active issues    Plan    - monitor function and electrolytes as needed with replacement per ICU protocols.  - generally avoid nephrotoxic agents such as NSAID, IV contrast unless specifically required  - adjust medications as needed for renal clearance  - follow I/O's as appropriate.    Infectious Disease:   No evidence for acute infection    Plan  -Monitor while off antibiotics    Endocrine:   No acute issues  Plan  - ICU insulin protocol, goal sugar <180  -Hypoglycemia protocol    Hematology/Oncology:   1.  Mild leukocytosis    Plan  -Monitor    IV/Access:   1. Venous access -peripheral IV  2. Arterial access -none    Plan  -No central access required and necessary      ICU Prophylaxis:   1. DVT: none  2. Stress Ulcer: PPI  3. Restraints: none  4. Feeding -NPO  5. Family Update: No family at the bedside  6. Disposition -ICU      Critical Care Time: 45 min.  I spent this time  "(excluding procedures) personally providing and directing critical care services at the bedside and on the critical care unit.     Sivakumar Flores MD   Pulmonary and Critical Care        Interval History:  Yesterday with upper gi bleed.  GI consulted.  IV PPI BID, no scope.  He had fluctuating mental status throughout the day.  At some points, was not able to be aroused at all while on minimal sedation.   Other points, he would be trying to get out of bed.  Scheduled diazepam started.        BP (!) 141/91   Pulse 60   Temp 99.4  F (37.4  C) (Axillary)   Resp 12   Ht 1.93 m (6' 4\")   Wt 70 kg (154 lb 5.2 oz)   SpO2 99%   BMI 18.78 kg/m      FiO2 (%): 25 %, Resp: 12, Vent Mode: (S) PS, Resp Rate (Set): 16 breaths/min, Tidal Volume (Set, mL): 550 mL, PEEP (cm H2O): 5 cmH2O, Pressure Support (cm H2O): 5 cmH2O, Resp Rate (Set): 16 breaths/min, Tidal Volume (Set, mL): 550 mL, PEEP (cm H2O): 5 cmH2O      Intake/Output Summary (Last 24 hours) at 12/7/2024 0440  Last data filed at 12/7/2024 0400  Gross per 24 hour   Intake --   Output 125 ml   Net -125 ml       Physical Exam:  Gen: following simple commands.   HEENT: ET tube noted.  Pulm: Coarse bilaterally  CV: Regular rate and rhythm, no murmurs.  Abd: Soft, nontender.  MSK: No deformities.  Skin: No rash.    Labs: reviewed  Na 139, K 3.8, BUN 14.5, Cr 0.95  WBC 10, Hgb 12, Plts 182    Imaging: reviewed         "

## 2024-12-08 NOTE — PROGRESS NOTES
Patient suddenly woke up very agitated, squirming in bed, attempting to pull at ETT. Endorses HA, significant tremors visible, and extremely agitated with staff, does not know how he ended up in the hospital and going to treatment facility yesterday. CIWA scored 26.  Unable to settle patient by verbal assurance, escalating doses of propofol and precedex unsuccessful. Verbal order received from Dr. Howard for 2 mg Ativan.

## 2024-12-08 NOTE — PROGRESS NOTES
ICU Note:    Low UOP. RN notes that previously improved with IVF bolus. Her assessment is that corona catheter is functioning. Hgb stable. pH normal after reducing RR on ventilator  - Continue current ventilator settings  - Daily CBC is sufficient  - IVF bolus  - Maintenance 100 ml/hour    Clarisa Myles MD

## 2024-12-08 NOTE — PROGRESS NOTES
Writer asked by bedside RN to arrange transportation for patient leaving AMA.  Patient reports not having any money or cell phone and that all of his belongings are at a treatment facility in [facility].  Patient has no other means of transportation.  Writer arranged for taxi ride using hospital account to transport patient to the following address: [address] [place] [address], [facility]. Patient will be picked up at door 6 by [employer].    Lucina Philip RN Care Coordinator  [facility]  [phone]

## 2024-12-08 NOTE — PROGRESS NOTES
[facility]  Gastroenterology Progress Note     Goran Redding MRN# [id number]   YOB: 2000 Age: 24 year old          Assessment and Plan:     Goran Redding IS a 24 year old male admitted on 12/7/2024 for alcohol withdrawal.     Alcohol abuse  Nausea and vomiting and hematemesis  Had electrolyte abnormalities and seizure like actively as well as respiratory failure that lead to intubation and OG tube inserted.  Hgb stable at 12    - Continue to monitor daily hemoglobin  - IV pantoprazole 40 mg BID  - LFts normal   - Gi will follow along peripherally           Data Unavailable      Interval History:     No blood noted in OG tube. Intubated and sedated              Review of Systems:     C: NEGATIVE for fever, chills, change in weight  E/M: NEGATIVE for ear, mouth and throat problems  R: NEGATIVE for significant cough or SOB  CV: NEGATIVE for chest pain, palpitations or peripheral edema             Medications:   I have reviewed this patient's current medications  Current Facility-Administered Medications   Medication Dose Route Frequency Provider Last Rate Last Admin    chlorhexidine (PERIDEX) 0.12 % solution 15 mL  15 mL Mouth/Throat Q12H Mercy Covarrubias MD   15 mL at 12/08/24 0832    [Held by provider] cloNIDine (CATAPRES) tablet 0.1 mg  0.1 mg NG or Feeding tube Q8H Mercy Covarrubias MD   0.1 mg at 12/07/24 0903    diazepam (VALIUM) injection 10 mg  10 mg Intravenous Q6H Francisco Howard MD   10 mg at 12/08/24 0833    [Held by provider] enoxaparin ANTICOAGULANT (LOVENOX) injection 40 mg  40 mg Subcutaneous Q24H Mercy Covarrubias MD   40 mg at 12/07/24 1014    [START ON 12/10/2024] folic acid (FOLVITE) tablet 1 mg  1 mg Oral Daily Mercy Covarrubias MD        folic acid injection 1 mg  1 mg Intravenous Daily Mercy Covarrubias MD        multivitamin w/minerals (THERA-VIT-M) tablet 1 tablet  1 tablet Oral or Feeding Tube Daily Mercy Covarrubias MD   1 tablet at 12/08/24 0832    pantoprazole  (PROTONIX) IV push injection 40 mg  40 mg Intravenous BID Lion Flores MD   40 mg at 12/08/24 0833    thiamine (B-1) injection 200 mg  200 mg Intravenous TID Mercy Covarrubias MD   200 mg at 12/08/24 0832    [START ON 12/9/2024] thiamine (B-1) tablet 100 mg  100 mg Oral TID Mercy Covarrubias MD        [START ON 12/14/2024] thiamine (B-1) tablet 100 mg  100 mg Oral Daily Mercy Covarrubias MD                      Physical Exam:   Vitals were reviewed  Vital Signs with Ranges  Temp:  [98  F (36.7  C)-99.4  F (37.4  C)] 99.4  F (37.4  C)  Pulse:  [58-97] 60  Resp:  [12-16] 12  BP: ()/(40-95) 141/91  FiO2 (%):  [25 %-30 %] 25 %  SpO2:  [64 %-100 %] 99 %  I/O last 3 completed shifts:  In: 2313.99 [I.V.:1243.99; NG/GT:70; IV Piggyback:1000]  Out: 1487 [Urine:987; Emesis/NG output:500]  Constitutional: Intubated and sedated  Respiratory:  Lungs CTAB.  No crackles, wheezes, or rhonchi, no labored breathing.  Cardiovascular:  Heart RRR, no MRG, no edema.  GI:  Abdomen soft,ND and with normoactive BS  Skin/Integumen:  Warm, dry, non-diaphoretic.  MSK: CMS x4 intact.             Data:   I reviewed the patient's new clinical lab test results.   Recent Labs   Lab Test 12/08/24  0545 12/07/24  2357 12/07/24  1945 12/07/24  1402   WBC 10.0 10.9 9.3 8.2   HGB 12.0* 12.2* 12.8* 12.9*   MCV 81 82 82 82    188 209 218   INR  --   --   --  1.11     Recent Labs   Lab Test 12/08/24  0545 12/07/24  0458   POTASSIUM 3.8 3.7   CHLORIDE 102 104   CO2 25 21*   BUN 14.5 13.5   ANIONGAP 12 20*     Recent Labs   Lab Test 12/08/24  0545 12/07/24  0458   ALBUMIN 3.6 4.5   BILITOTAL 0.7 0.3   ALT 17 26   AST 21 37       I reviewed the patient's new imaging results.    All laboratory data reviewed  All imaging studies reviewed by me.    Alexandria Davison PA-C,  12/8/2024  [facility]  Office : [phone]  Cell: [phone] (Dr. Collier)  Cell: [phone] (Alexandria Davison PA-C)

## 2024-12-08 NOTE — PLAN OF CARE
Problem: Adult Inpatient Plan of Care  Goal: Plan of Care Review  Description: The Plan of Care Review/Shift note should be completed every shift.  The Outcome Evaluation is a brief statement about your assessment that the patient is improving, declining, or no change.  This information will be displayed automatically on your shift  note.  Outcome: Progressing  Flowsheets (Taken 12/7/2024 1757)  Outcome Evaluation: Pt failed sedation holiday this AM, needing escalating doses of propofol and dilaudid, ativan given and precedex started. BP dropped significantly later in the morning, requiring up to 0.1 of levophed, 500 ml LR bolus given. Intermittently needing small doses of levophed to maintain MAP >65. Able to wean propofol down to 10 and turn of dilaudid with use of precedex. Patient able to follow commands better but still wakes up anxious and pulling at lines. OG LIS with bright red blood, serial Hgb ordered. Morin with minimal urine output, green/brown urine. 1L LR bolus given, improved UOP seen. Plan for SBT tomorrow.  Plan of Care Reviewed With: patient  Overall Patient Progress: improving     Problem: Adult Inpatient Plan of Care  Goal: Absence of Hospital-Acquired Illness or Injury  Intervention: Identify and Manage Fall Risk  Recent Flowsheet Documentation  Taken 12/7/2024 1600 by Halie Zamora, RN  Safety Promotion/Fall Prevention:   safety round/check completed   mobility aid in reach   lighting adjusted   assistive device/personal items within reach   activity supervised  Taken 12/7/2024 1200 by Halie Zamora, RN  Safety Promotion/Fall Prevention:   safety round/check completed   mobility aid in reach   lighting adjusted   assistive device/personal items within reach   activity supervised  Taken 12/7/2024 0800 by Halie Zamora, RN  Safety Promotion/Fall Prevention:   safety round/check completed   mobility aid in reach   lighting adjusted   assistive device/personal items within reach   activity  supervised  Intervention: Prevent Skin Injury  Recent Flowsheet Documentation  Taken 12/7/2024 1600 by Halie Zamora RN  Body Position:   turned   right   heels elevated  Taken 12/7/2024 1400 by Halie Zamora RN  Body Position:   turned   left   heels elevated  Taken 12/7/2024 1200 by Halie Zamora RN  Body Position: turned  Taken 12/7/2024 1000 by Halie Zamora RN  Body Position:   turned   heels elevated  Taken 12/7/2024 0800 by Halie Zamora RN  Body Position: turned  Intervention: Prevent and Manage VTE (Venous Thromboembolism) Risk  Recent Flowsheet Documentation  Taken 12/7/2024 1600 by Halie Zamora RN  VTE Prevention/Management: SCDs on (sequential compression devices)  Taken 12/7/2024 1200 by Halie Zamora RN  VTE Prevention/Management: SCDs on (sequential compression devices)  Taken 12/7/2024 0800 by Halie Zamora RN  VTE Prevention/Management: SCDs on (sequential compression devices)